# Patient Record
Sex: FEMALE | Race: WHITE | NOT HISPANIC OR LATINO | Employment: FULL TIME | URBAN - METROPOLITAN AREA
[De-identification: names, ages, dates, MRNs, and addresses within clinical notes are randomized per-mention and may not be internally consistent; named-entity substitution may affect disease eponyms.]

---

## 2019-02-04 ENCOUNTER — OFFICE VISIT (OUTPATIENT)
Dept: URGENT CARE | Facility: CLINIC | Age: 24
End: 2019-02-04
Payer: COMMERCIAL

## 2019-02-04 VITALS
HEIGHT: 69 IN | DIASTOLIC BLOOD PRESSURE: 89 MMHG | BODY MASS INDEX: 35.55 KG/M2 | TEMPERATURE: 98 F | SYSTOLIC BLOOD PRESSURE: 132 MMHG | RESPIRATION RATE: 18 BRPM | HEART RATE: 82 BPM | OXYGEN SATURATION: 96 % | WEIGHT: 240 LBS

## 2019-02-04 DIAGNOSIS — S61.213A LACERATION OF LEFT MIDDLE FINGER WITHOUT FOREIGN BODY WITHOUT DAMAGE TO NAIL, INITIAL ENCOUNTER: Primary | ICD-10-CM

## 2019-02-04 PROCEDURE — 12002 PR RESUP NPTERF WND BODY 2.6-7.5 CM: ICD-10-PCS | Mod: S$GLB,,, | Performed by: FAMILY MEDICINE

## 2019-02-04 PROCEDURE — 99203 OFFICE O/P NEW LOW 30 MIN: CPT | Mod: 25,S$GLB,, | Performed by: FAMILY MEDICINE

## 2019-02-04 PROCEDURE — 99203 PR OFFICE/OUTPT VISIT, NEW, LEVL III, 30-44 MIN: ICD-10-PCS | Mod: 25,S$GLB,, | Performed by: FAMILY MEDICINE

## 2019-02-04 PROCEDURE — 90471 TDAP VACCINE GREATER THAN OR EQUAL TO 7YO IM: ICD-10-PCS | Mod: S$GLB,,, | Performed by: FAMILY MEDICINE

## 2019-02-04 PROCEDURE — 12002 RPR S/N/AX/GEN/TRNK2.6-7.5CM: CPT | Mod: S$GLB,,, | Performed by: FAMILY MEDICINE

## 2019-02-04 PROCEDURE — 90471 IMMUNIZATION ADMIN: CPT | Mod: S$GLB,,, | Performed by: FAMILY MEDICINE

## 2019-02-04 PROCEDURE — 90715 TDAP VACCINE 7 YRS/> IM: CPT | Mod: S$GLB,,, | Performed by: FAMILY MEDICINE

## 2019-02-04 PROCEDURE — 90715 TDAP VACCINE GREATER THAN OR EQUAL TO 7YO IM: ICD-10-PCS | Mod: S$GLB,,, | Performed by: FAMILY MEDICINE

## 2019-02-04 RX ORDER — ESCITALOPRAM OXALATE 10 MG/1
10 TABLET ORAL DAILY
COMMUNITY
End: 2019-09-11

## 2019-02-04 RX ORDER — DEXTROAMPHETAMINE SACCHARATE, AMPHETAMINE ASPARTATE MONOHYDRATE, DEXTROAMPHETAMINE SULFATE AND AMPHETAMINE SULFATE 2.5; 2.5; 2.5; 2.5 MG/1; MG/1; MG/1; MG/1
20 CAPSULE, EXTENDED RELEASE ORAL EVERY MORNING
COMMUNITY
End: 2019-09-11

## 2019-02-06 ENCOUNTER — OFFICE VISIT (OUTPATIENT)
Dept: URGENT CARE | Facility: CLINIC | Age: 24
End: 2019-02-06
Payer: COMMERCIAL

## 2019-02-06 VITALS
DIASTOLIC BLOOD PRESSURE: 84 MMHG | WEIGHT: 240 LBS | SYSTOLIC BLOOD PRESSURE: 122 MMHG | HEART RATE: 90 BPM | TEMPERATURE: 98 F | OXYGEN SATURATION: 100 % | BODY MASS INDEX: 35.55 KG/M2 | RESPIRATION RATE: 16 BRPM | HEIGHT: 69 IN

## 2019-02-06 DIAGNOSIS — Z51.89 VISIT FOR WOUND CHECK: Primary | ICD-10-CM

## 2019-02-06 PROCEDURE — 99499 NO LOS: ICD-10-PCS | Mod: S$GLB,,, | Performed by: NURSE PRACTITIONER

## 2019-02-06 PROCEDURE — 99499 UNLISTED E&M SERVICE: CPT | Mod: S$GLB,,, | Performed by: NURSE PRACTITIONER

## 2019-02-06 NOTE — PROGRESS NOTES
"Subjective:       Patient ID: Micaela Arreguin is a 24 y.o. female.    Vitals:  height is 5' 9" (1.753 m) and weight is 108.9 kg (240 lb). Her oral temperature is 98.4 °F (36.9 °C). Her blood pressure is 122/84 and her pulse is 90. Her respiration is 16 and oxygen saturation is 100%.     Chief Complaint: Hand Pain (left hand)    Onset from Sunday, [place] urgent care [place] and her finger was glued.  She just wants to make sure it is normal for it to appear the way it does        Hand Pain    The incident occurred 3 to 5 days ago. The incident occurred at home. The injury mechanism was a direct blow. The pain is present in the right hand. The pain does not radiate. The pain is at a severity of 5/10. The pain is moderate. The pain has been fluctuating since the incident. The symptoms are aggravated by palpation.       Constitution: Negative for chills and fever.   HENT: Negative for facial swelling and sore throat.    Neck: Negative for painful lymph nodes.   Eyes: Negative for eye itching and eyelid swelling.   Respiratory: Negative for cough.    Musculoskeletal: Negative for joint pain and joint swelling.   Skin: Positive for rash and laceration. Negative for color change, pale, wound, abrasion, lesion, skin thickening/induration, puncture wound, erythema, bruising, abscess, avulsion and hives.   Allergic/Immunologic: Negative for environmental allergies, immunocompromised state and hives.   Hematologic/Lymphatic: Negative for swollen lymph nodes.       Objective:      Physical Exam   Musculoskeletal:        Hands:  Skin: No erythema.   Vitals reviewed.      Assessment:       1. Visit for wound check        Plan:         Visit for wound check    No results found for this or any previous visit.  Patient Instructions     Laceration, Chin, Skin Glue Repair  A laceration is a cut through the skin. If you have a chin laceration, skin glue may be used to repair the cut. Skin glue is usually used on cuts " that are shallow, have smooth edges, and are not infected. A lower layer of skin may be closed with sutures before skin glue is applied. Skin glue provides a water-resistant covering that allows for fast healing. No bandages are required and skin glue peels off on its own within 5 to 10 days.  Home care  Medicines:  Acetaminophen or ibuprofen may be taken for pain, unless another pain medicine was prescribed. If you have chronic liver or kidney disease, talk with your doctor before using these medicines. Also talk with your doctor if you have ever had a stomach ulcer or gastrointestinal bleeding.  General care  · Keep the wound clean and dry. You may shower or bathe as usual, but do not use soaps, lotions, or ointments on the wound area. Do not scrub the wound. After bathing, pat the wound dry with a soft towel.  · Avoid soaking the laceration in water for 7 to 10 days. Use a clean cloth to gently pat the area dry if it gets wet.  · Do not scratch, rub, or pick at the skin glue. Do not place tape directly over the skin glue.  · Do not apply liquids (such as peroxide), ointments, or creams to the wound while the skin glue is in place.  · If the adhesive does not peel off after 10 days, apply petroleum jelly or an antibiotic ointment to the area.  · Most chin wounds heal without problems. However, an infection sometimes occurs despite proper treatment. Therefore, watch for the signs of infection listed below.  · Certain types of skin glues cannot be used if you have an allergy to latex or formaldehyde. Tell your doctor right away about your allergies.  Follow-up care  Follow up with your healthcare provider, or as advised. The skin glue will fall off naturally in 5 to 10 days.  When to seek medical advice  Call your healthcare provider right away if any of these occur:  · Signs of infection:  ¨ Fever of 100.4°F (38ºC) or higher, or as directed by your health care provider  ¨ Increasing pain in the wound  ¨ Increasing  redness or swelling  ¨ Pus coming from the wound  · Wound bleeds more than a small amount or bleeding doesnt stop  · Wound edges come apart  Date Last Reviewed: 9/1/2016  © 3046-0247 [employer]. [address] [place] [address], [place] [address]. All rights reserved. This information is not intended as a substitute for professional medical care. Always follow your healthcare professional's instructions.

## 2019-02-06 NOTE — PATIENT INSTRUCTIONS
Laceration, Chin, Skin Glue Repair  A laceration is a cut through the skin. If you have a chin laceration, skin glue may be used to repair the cut. Skin glue is usually used on cuts that are shallow, have smooth edges, and are not infected. A lower layer of skin may be closed with sutures before skin glue is applied. Skin glue provides a water-resistant covering that allows for fast healing. No bandages are required and skin glue peels off on its own within 5 to 10 days.  Home care  Medicines:  Acetaminophen or ibuprofen may be taken for pain, unless another pain medicine was prescribed. If you have chronic liver or kidney disease, talk with your doctor before using these medicines. Also talk with your doctor if you have ever had a stomach ulcer or gastrointestinal bleeding.  General care  · Keep the wound clean and dry. You may shower or bathe as usual, but do not use soaps, lotions, or ointments on the wound area. Do not scrub the wound. After bathing, pat the wound dry with a soft towel.  · Avoid soaking the laceration in water for 7 to 10 days. Use a clean cloth to gently pat the area dry if it gets wet.  · Do not scratch, rub, or pick at the skin glue. Do not place tape directly over the skin glue.  · Do not apply liquids (such as peroxide), ointments, or creams to the wound while the skin glue is in place.  · If the adhesive does not peel off after 10 days, apply petroleum jelly or an antibiotic ointment to the area.  · Most chin wounds heal without problems. However, an infection sometimes occurs despite proper treatment. Therefore, watch for the signs of infection listed below.  · Certain types of skin glues cannot be used if you have an allergy to latex or formaldehyde. Tell your doctor right away about your allergies.  Follow-up care  Follow up with your healthcare provider, or as advised. The skin glue will fall off naturally in 5 to 10 days.  When to seek medical advice  Call your healthcare provider  right away if any of these occur:  · Signs of infection:  ¨ Fever of 100.4°F (38ºC) or higher, or as directed by your health care provider  ¨ Increasing pain in the wound  ¨ Increasing redness or swelling  ¨ Pus coming from the wound  · Wound bleeds more than a small amount or bleeding doesnt stop  · Wound edges come apart  Date Last Reviewed: 9/1/2016  © 7222-0559 [employer]. [address] [place] [address], [place] [address]. All rights reserved. This information is not intended as a substitute for professional medical care. Always follow your healthcare professional's instructions.

## 2019-07-01 ENCOUNTER — TELEPHONE (OUTPATIENT)
Dept: OBSTETRICS AND GYNECOLOGY | Facility: CLINIC | Age: 24
End: 2019-07-01

## 2019-07-01 ENCOUNTER — PATIENT MESSAGE (OUTPATIENT)
Dept: OBSTETRICS AND GYNECOLOGY | Facility: CLINIC | Age: 24
End: 2019-07-01

## 2019-09-11 ENCOUNTER — OFFICE VISIT (OUTPATIENT)
Dept: OBSTETRICS AND GYNECOLOGY | Facility: CLINIC | Age: 24
End: 2019-09-11
Attending: OBSTETRICS & GYNECOLOGY
Payer: COMMERCIAL

## 2019-09-11 VITALS
SYSTOLIC BLOOD PRESSURE: 130 MMHG | BODY MASS INDEX: 36.73 KG/M2 | HEIGHT: 69 IN | WEIGHT: 248 LBS | DIASTOLIC BLOOD PRESSURE: 80 MMHG

## 2019-09-11 DIAGNOSIS — Z01.419 ENCOUNTER FOR GYNECOLOGICAL EXAMINATION WITHOUT ABNORMAL FINDING: ICD-10-CM

## 2019-09-11 DIAGNOSIS — Z12.4 SCREENING FOR MALIGNANT NEOPLASM OF CERVIX: Primary | ICD-10-CM

## 2019-09-11 PROCEDURE — 99385 PREV VISIT NEW AGE 18-39: CPT | Mod: S$GLB,,, | Performed by: OBSTETRICS & GYNECOLOGY

## 2019-09-11 PROCEDURE — 88175 CYTOPATH C/V AUTO FLUID REDO: CPT

## 2019-09-11 PROCEDURE — 99385 PR PREVENTIVE VISIT,NEW,18-39: ICD-10-PCS | Mod: S$GLB,,, | Performed by: OBSTETRICS & GYNECOLOGY

## 2019-09-11 RX ORDER — DEXTROAMPHETAMINE SACCHARATE, AMPHETAMINE ASPARTATE MONOHYDRATE, DEXTROAMPHETAMINE SULFATE AND AMPHETAMINE SULFATE 5; 5; 5; 5 MG/1; MG/1; MG/1; MG/1
CAPSULE, EXTENDED RELEASE ORAL
COMMUNITY
Start: 2019-08-21 | End: 2020-12-03 | Stop reason: SDUPTHER

## 2019-09-11 RX ORDER — ESCITALOPRAM OXALATE 20 MG/1
TABLET ORAL
COMMUNITY
Start: 2019-08-07 | End: 2020-09-28 | Stop reason: ALTCHOICE

## 2019-09-11 NOTE — PROGRESS NOTES
SUBJECTIVE:   24 y.o. female   for annual routine Pap and checkup. Patient's last menstrual period was 2019..  She has no unusual complaints.    She declines STD testing  Past Medical History:   Diagnosis Date    ADHD (attention deficit hyperactivity disorder)     Depression      Past Surgical History:   Procedure Laterality Date    WISDOM TOOTH EXTRACTION       Social History     Socioeconomic History    Marital status: Single     Spouse name: Not on file    Number of children: Not on file    Years of education: Not on file    Highest education level: Not on file   Occupational History    Not on file   Social Needs    Financial resource strain: Not on file    Food insecurity:     Worry: Not on file     Inability: Not on file    Transportation needs:     Medical: Not on file     Non-medical: Not on file   Tobacco Use    Smoking status: Never Smoker    Smokeless tobacco: Never Used   Substance and Sexual Activity    Alcohol use: Yes     Frequency: Never     Comment: socially    Drug use: Never    Sexual activity: Not Currently     Birth control/protection: None   Lifestyle    Physical activity:     Days per week: Not on file     Minutes per session: Not on file    Stress: Not on file   Relationships    Social connections:     Talks on phone: Not on file     Gets together: Not on file     Attends Anabaptist service: Not on file     Active member of club or organization: Not on file     Attends meetings of clubs or organizations: Not on file     Relationship status: Not on file   Other Topics Concern    Not on file   Social History Narrative    Not on file     Family History   Problem Relation Age of Onset    No Known Problems Mother     ALS Father     Breast cancer Neg Hx     Cancer Neg Hx     Colon cancer Neg Hx     Ovarian cancer Neg Hx      OB History    Para Term  AB Living   0 0 0 0 0 0   SAB TAB Ectopic Multiple Live Births   0 0 0 0 0           Current  Outpatient Medications   Medication Sig Dispense Refill    dextroamphetamine-amphetamine (ADDERALL XR) 20 MG 24 hr capsule       escitalopram oxalate (LEXAPRO) 20 MG tablet       norethindrone-e.estradiol-iron (LO LOESTRIN FE) 1 mg-10 mcg (24)/10 mcg (2) Tab Take 1 tablet by mouth once daily. 24 tablet 11     No current facility-administered medications for this visit.      Allergies: Patient has no known allergies.     The ASCVD Risk score (Van CHINEDU Jr., et al., 2013) failed to calculate for the following reasons:    The 2013 ASCVD risk score is only valid for ages 40 to 79      ROS:  Constitutional: no weight loss, weight gain, fever, fatigue  Eyes:  No vision changes, glasses/contacts  ENT/Mouth: No ulcers, sinus problems, ears ringing, headache  Cardiovascular: No inability to lie flat, chest pain, exercise intolerance, swelling, heart palpitations  Respiratory: No wheezing, coughing blood, shortness of breath, or cough  Gastrointestinal: No diarrhea, bloody stool, nausea/vomiting, constipation, gas, hemorrhoids  Genitourinary: No blood in urine, painful urination, urgency of urination, frequency of urination, incomplete emptying, incontinence, abnormal bleeding, painful periods, heavy periods, vaginal discharge, vaginal odor, painful intercourse, sexual problems, bleeding after intercourse.  Musculoskeletal: No muscle weakness  Skin/Breast: No painful breasts, nipple discharge, masses, rash, ulcers  Neurological: No passing out, seizures, numbness, headache  Endocrine: No diabetes, hypothyroid, hyperthyroid, hot flashes, hair loss, abnormal hair growth, acne  Psychiatric: No depression, crying  Hematologic: No bruises, bleeding, swollen lymph nodes, anemia.      Physical Exam:   Constitutional: She is oriented to person, place, and time. She appears well-developed and well-nourished.      Neck: Normal range of motion. No tracheal deviation present. No thyromegaly present.    Cardiovascular: Exam reveals no  edema.     Pulmonary/Chest: Effort normal. She exhibits no mass, no tenderness, no deformity and no retraction. Right breast exhibits no inverted nipple, no mass, no nipple discharge, no skin change, no tenderness, presence, no bleeding and no swelling. Left breast exhibits no inverted nipple, no mass, no nipple discharge, no skin change, no tenderness, presence, no bleeding and no swelling. Breasts are symmetrical.        Abdominal: Soft. She exhibits no distension and no mass. There is no tenderness. There is no rebound and no guarding. No hernia. Hernia confirmed negative in the left inguinal area.     Genitourinary: Vagina normal and uterus normal. Rectal exam shows no external hemorrhoid. There is no rash, tenderness or lesion on the right labia. There is no rash, tenderness or lesion on the left labia. Uterus is not deviated. Cervix is normal. No no adexnal prolapse. Right adnexum displays no mass, no tenderness and no fullness. Left adnexum displays no mass, no tenderness and no fullness. No tenderness, bleeding, rectocele, cystocele or unspecified prolapse of vaginal walls in the vagina. No vaginal discharge found. Cervix exhibits no motion tenderness, no discharge and no friability.           Musculoskeletal: Normal range of motion and moves all extremeties. She exhibits no edema.      Lymphadenopathy:        Right: No inguinal adenopathy present.        Left: No inguinal adenopathy present.    Neurological: She is alert and oriented to person, place, and time.    Skin: No rash noted. No erythema. No pallor.    Psychiatric: She has a normal mood and affect. Her behavior is normal. Judgment and thought content normal.         ASSESSMENT:   well woman  no contraindication to continue use of oral contraceptives    PLAN:   return annually or prn  Pap smear

## 2019-10-14 ENCOUNTER — TELEPHONE (OUTPATIENT)
Dept: OBSTETRICS AND GYNECOLOGY | Facility: CLINIC | Age: 24
End: 2019-10-14

## 2019-10-14 RX ORDER — NORETHINDRONE ACETATE AND ETHINYL ESTRADIOL 1MG-20(21)
1 KIT ORAL DAILY
Qty: 30 TABLET | Refills: 11 | Status: SHIPPED | OUTPATIENT
Start: 2019-10-14 | End: 2020-10-27 | Stop reason: SDUPTHER

## 2019-10-14 NOTE — TELEPHONE ENCOUNTER
Left message for pt faxed received from [employer] that the Loloestrin is not covered by insurance and pt should call back to let me know if she wants to change or not

## 2019-10-14 NOTE — TELEPHONE ENCOUNTER
Pt called back and states she would like a pill that is covered by her insurance. Advised pt will send one in

## 2019-11-06 ENCOUNTER — OFFICE VISIT (OUTPATIENT)
Dept: OPTOMETRY | Facility: CLINIC | Age: 24
End: 2019-11-06
Payer: COMMERCIAL

## 2019-11-06 DIAGNOSIS — H04.123 DRY EYES, BILATERAL: Primary | ICD-10-CM

## 2019-11-06 PROCEDURE — 99999 PR PBB SHADOW E&M-EST. PATIENT-LVL II: ICD-10-PCS | Mod: PBBFAC,,, | Performed by: OPTOMETRIST

## 2019-11-06 PROCEDURE — 92004 COMPRE OPH EXAM NEW PT 1/>: CPT | Mod: S$GLB,,, | Performed by: OPTOMETRIST

## 2019-11-06 PROCEDURE — 92015 PR REFRACTION: ICD-10-PCS | Mod: S$GLB,,, | Performed by: OPTOMETRIST

## 2019-11-06 PROCEDURE — 92015 DETERMINE REFRACTIVE STATE: CPT | Mod: S$GLB,,, | Performed by: OPTOMETRIST

## 2019-11-06 PROCEDURE — 99999 PR PBB SHADOW E&M-EST. PATIENT-LVL II: CPT | Mod: PBBFAC,,, | Performed by: OPTOMETRIST

## 2019-11-06 PROCEDURE — 92004 PR EYE EXAM, NEW PATIENT,COMPREHESV: ICD-10-PCS | Mod: S$GLB,,, | Performed by: OPTOMETRIST

## 2019-11-06 NOTE — PROGRESS NOTES
HPI     WENDY: 2016  Pt states she is having blurry VA in just her left eye with reading and   dist, even in the morning when she wakes up. Pt states that he blurry VA   comes and goes. Never worn spex  No f/f  No gtts  No sx     Last edited by eZ Chin, OD on 11/6/2019  4:12 PM. (History)        ROS     Negative for: Constitutional, Gastrointestinal, Neurological, Skin,   Genitourinary, Musculoskeletal, HENT, Endocrine, Cardiovascular, Eyes,   Respiratory, Psychiatric, Allergic/Imm, Heme/Lymph    Last edited by Ze Chin, OD on 11/6/2019  4:12 PM. (History)        Assessment /Plan     For exam results, see Encounter Report.    Dry eyes, bilateral      Dry eye sx w inc computer use.  Advised SOOTHE XP Ats TID+.  Also discussed otc readers +1.00 to help eyestrain    PLAN:    Pt will call if sx persist w tx--otherwise rtc 1 yr

## 2020-09-14 ENCOUNTER — PATIENT OUTREACH (OUTPATIENT)
Dept: ADMINISTRATIVE | Facility: HOSPITAL | Age: 25
End: 2020-09-14

## 2020-09-27 NOTE — PROGRESS NOTES
Subjective:       Patient ID: Micaela Arreguin is a 25 y.o. female.    Chief Complaint: Establish Care    HPI    The patient location is: [place]  The chief complaint leading to consultation is: establish care and medical concerns  Visit type: Virtual visit with synchronous audio and video  Total time spent with patient: 18 minutes  Each patient to whom he or she provides medical services by telemedicine is:  (1) informed of the relationship between the physician and patient and the respective role of any other health care provider with respect to management of the patient; and (2) notified that he or she may decline to receive medical services by telemedicine and may withdraw from such care at any time.    Notes:     24 yo here to establish care and discuss medical concerns.     She reports that she develops a sensation of scratchy throat after eating walnuts. Mild tongue swelling- felt heavy. She denies throat swelling. No dyspnea. No dysphagia or odynophagia. She has taken benadryl as needed with improvement.     She also reports sporadically seeing bright red blood on toilet paper after a BM. This started over a year ago. Recently has noticed every few days. No bleeding without a BM. Rarely in toilet bowl/stool. She denies straining. She denies rectal irritation. She reports a family history of IBS and reports that she does experience abdominal pain with sudden urge to defecate. No known family history of IBD or colon cancer.       Review of Systems   Constitutional: Negative for activity change and unexpected weight change.   HENT: Positive for rhinorrhea. Negative for hearing loss and trouble swallowing.    Eyes: Negative for discharge and visual disturbance.   Respiratory: Negative for chest tightness, shortness of breath, wheezing and stridor.    Cardiovascular: Negative for chest pain and palpitations.   Gastrointestinal: Positive for anal bleeding. Negative for blood in stool, constipation, diarrhea, rectal  pain and vomiting.   Endocrine: Negative for polydipsia and polyuria.   Genitourinary: Negative for difficulty urinating, dysuria, hematuria and menstrual problem.   Musculoskeletal: Negative for arthralgias, joint swelling and neck pain.   Neurological: Negative for weakness and headaches.   Psychiatric/Behavioral: Negative for confusion and dysphoric mood.       Objective:        There were no vitals filed for this visit.    Physical Exam  Constitutional:       General: She is not in acute distress.     Appearance: She is well-developed. She is not diaphoretic.   HENT:      Head: Normocephalic and atraumatic.      Right Ear: External ear normal.      Left Ear: External ear normal.   Eyes:      Conjunctiva/sclera: Conjunctivae normal.   Neck:      Musculoskeletal: Normal range of motion.   Pulmonary:      Effort: Pulmonary effort is normal. No respiratory distress.   Neurological:      Mental Status: She is alert.   Psychiatric:         Behavior: Behavior normal.         Assessment:     1. Encounter to establish care    2. [place] allergy    3. Rectal bleeding    4. Irritable bowel syndrome, unspecified type           Plan:         1. Encounter to establish care  - pap utd  - immunizations utd  - CBC auto differential; Future  - Comprehensive metabolic panel; Future  - Hemoglobin A1C; Future  - Lipid Panel; Future  - TSH; Future    2. [place] allergy  - avoid trigger  - she will consider allergy consult  - discussed epi-pen and ER precautions  - EPINEPHrine (EPIPEN) 0.3 mg/0.3 mL AtIn; Inject 0.3 mLs (0.3 mg total) into the muscle once. for 1 dose  Dispense: 0.6 mL; Refill: 1    3. Rectal bleeding  - suspect hemorrhoid, but occ abd pain and sudden urge to defecate need evaluation. D/w her the limited nature of virtual visit; will refer to GI.   - Ambulatory referral/consult to Gastroenterology; Future  - hydrocortisone (ANUSOL-HC) 2.5 % rectal cream; Apply rectally 2 times daily  Dispense: 28 g; Refill: 1    4.  Irritable bowel syndrome, unspecified type  - Ambulatory referral/consult to Gastroenterology; Future           Patient note was created using MModal Dictation.  Any errors in syntax or even information may not have been identified and edited on initial review prior to signing this note.

## 2020-09-28 ENCOUNTER — OFFICE VISIT (OUTPATIENT)
Dept: INTERNAL MEDICINE | Facility: CLINIC | Age: 25
End: 2020-09-28
Payer: COMMERCIAL

## 2020-09-28 DIAGNOSIS — Z91.018 WALNUT ALLERGY: ICD-10-CM

## 2020-09-28 DIAGNOSIS — K58.9 IRRITABLE BOWEL SYNDROME, UNSPECIFIED TYPE: ICD-10-CM

## 2020-09-28 DIAGNOSIS — Z76.89 ENCOUNTER TO ESTABLISH CARE: Primary | ICD-10-CM

## 2020-09-28 DIAGNOSIS — K62.5 RECTAL BLEEDING: ICD-10-CM

## 2020-09-28 PROCEDURE — 99204 OFFICE O/P NEW MOD 45 MIN: CPT | Mod: 95,,, | Performed by: INTERNAL MEDICINE

## 2020-09-28 PROCEDURE — 99204 PR OFFICE/OUTPT VISIT, NEW, LEVL IV, 45-59 MIN: ICD-10-PCS | Mod: 95,,, | Performed by: INTERNAL MEDICINE

## 2020-09-28 RX ORDER — EPINEPHRINE 0.3 MG/.3ML
1 INJECTION SUBCUTANEOUS ONCE
Qty: 0.6 ML | Refills: 1 | Status: SHIPPED | OUTPATIENT
Start: 2020-09-28 | End: 2021-09-22

## 2020-09-28 RX ORDER — HYDROCORTISONE 25 MG/G
CREAM TOPICAL
Qty: 28 G | Refills: 1 | Status: SHIPPED | OUTPATIENT
Start: 2020-09-28 | End: 2021-06-20

## 2020-09-28 RX ORDER — ZOLPIDEM TARTRATE 10 MG/1
TABLET ORAL
COMMUNITY
Start: 2020-08-15 | End: 2021-06-20

## 2020-09-28 RX ORDER — DESVENLAFAXINE SUCCINATE 50 MG/1
TABLET, EXTENDED RELEASE ORAL
COMMUNITY
Start: 2020-09-15 | End: 2022-03-04

## 2020-09-28 RX ORDER — DEXTROAMPHETAMINE SACCHARATE, AMPHETAMINE ASPARTATE MONOHYDRATE, DEXTROAMPHETAMINE SULFATE AND AMPHETAMINE SULFATE 7.5; 7.5; 7.5; 7.5 MG/1; MG/1; MG/1; MG/1
CAPSULE, EXTENDED RELEASE ORAL
COMMUNITY
Start: 2020-02-28 | End: 2023-10-09

## 2020-09-28 NOTE — Clinical Note
Please assist pt in scheduling fasting labs.   Please have referral coordinator schedule GI consult.

## 2020-10-02 ENCOUNTER — PATIENT MESSAGE (OUTPATIENT)
Dept: INTERNAL MEDICINE | Facility: CLINIC | Age: 25
End: 2020-10-02

## 2020-10-23 ENCOUNTER — PATIENT MESSAGE (OUTPATIENT)
Dept: INTERNAL MEDICINE | Facility: CLINIC | Age: 25
End: 2020-10-23

## 2020-10-27 RX ORDER — NORETHINDRONE ACETATE AND ETHINYL ESTRADIOL 1MG-20(21)
1 KIT ORAL DAILY
Qty: 30 TABLET | Refills: 2 | Status: SHIPPED | OUTPATIENT
Start: 2020-10-27 | End: 2021-06-20

## 2020-10-27 NOTE — TELEPHONE ENCOUNTER
Faxed received from [employer] for refill on OCPs. Pt last seen 9/2019. Pt is due for a appointment

## 2020-10-30 ENCOUNTER — PATIENT MESSAGE (OUTPATIENT)
Dept: INTERNAL MEDICINE | Facility: CLINIC | Age: 25
End: 2020-10-30

## 2020-11-12 ENCOUNTER — LAB VISIT (OUTPATIENT)
Dept: LAB | Facility: HOSPITAL | Age: 25
End: 2020-11-12
Attending: INTERNAL MEDICINE
Payer: COMMERCIAL

## 2020-11-12 DIAGNOSIS — Z76.89 ENCOUNTER TO ESTABLISH CARE: ICD-10-CM

## 2020-11-12 LAB
ALBUMIN SERPL BCP-MCNC: 3.8 G/DL (ref 3.5–5.2)
ALP SERPL-CCNC: 82 U/L (ref 55–135)
ALT SERPL W/O P-5'-P-CCNC: 18 U/L (ref 10–44)
ANION GAP SERPL CALC-SCNC: 9 MMOL/L (ref 8–16)
AST SERPL-CCNC: 21 U/L (ref 10–40)
BASOPHILS # BLD AUTO: 0.05 K/UL (ref 0–0.2)
BASOPHILS NFR BLD: 0.6 % (ref 0–1.9)
BILIRUB SERPL-MCNC: 0.6 MG/DL (ref 0.1–1)
BUN SERPL-MCNC: 9 MG/DL (ref 6–20)
CALCIUM SERPL-MCNC: 9.3 MG/DL (ref 8.7–10.5)
CHLORIDE SERPL-SCNC: 104 MMOL/L (ref 95–110)
CHOLEST SERPL-MCNC: 176 MG/DL (ref 120–199)
CHOLEST/HDLC SERPL: 3.6 {RATIO} (ref 2–5)
CO2 SERPL-SCNC: 25 MMOL/L (ref 23–29)
CREAT SERPL-MCNC: 0.8 MG/DL (ref 0.5–1.4)
DIFFERENTIAL METHOD: ABNORMAL
EOSINOPHIL # BLD AUTO: 0.1 K/UL (ref 0–0.5)
EOSINOPHIL NFR BLD: 0.6 % (ref 0–8)
ERYTHROCYTE [DISTWIDTH] IN BLOOD BY AUTOMATED COUNT: 12.1 % (ref 11.5–14.5)
EST. GFR  (AFRICAN AMERICAN): >60 ML/MIN/1.73 M^2
EST. GFR  (NON AFRICAN AMERICAN): >60 ML/MIN/1.73 M^2
ESTIMATED AVG GLUCOSE: 100 MG/DL (ref 68–131)
GLUCOSE SERPL-MCNC: 68 MG/DL (ref 70–110)
HBA1C MFR BLD HPLC: 5.1 % (ref 4–5.6)
HCT VFR BLD AUTO: 41 % (ref 37–48.5)
HDLC SERPL-MCNC: 49 MG/DL (ref 40–75)
HDLC SERPL: 27.8 % (ref 20–50)
HGB BLD-MCNC: 13.1 G/DL (ref 12–16)
IMM GRANULOCYTES # BLD AUTO: 0.03 K/UL (ref 0–0.04)
IMM GRANULOCYTES NFR BLD AUTO: 0.4 % (ref 0–0.5)
LDLC SERPL CALC-MCNC: 111.8 MG/DL (ref 63–159)
LYMPHOCYTES # BLD AUTO: 1.8 K/UL (ref 1–4.8)
LYMPHOCYTES NFR BLD: 21 % (ref 18–48)
MCH RBC QN AUTO: 29.6 PG (ref 27–31)
MCHC RBC AUTO-ENTMCNC: 32 G/DL (ref 32–36)
MCV RBC AUTO: 93 FL (ref 82–98)
MONOCYTES # BLD AUTO: 0.7 K/UL (ref 0.3–1)
MONOCYTES NFR BLD: 8.2 % (ref 4–15)
NEUTROPHILS # BLD AUTO: 5.9 K/UL (ref 1.8–7.7)
NEUTROPHILS NFR BLD: 69.2 % (ref 38–73)
NONHDLC SERPL-MCNC: 127 MG/DL
NRBC BLD-RTO: 0 /100 WBC
PLATELET # BLD AUTO: 361 K/UL (ref 150–350)
PMV BLD AUTO: 10.7 FL (ref 9.2–12.9)
POTASSIUM SERPL-SCNC: 4.1 MMOL/L (ref 3.5–5.1)
PROT SERPL-MCNC: 7.1 G/DL (ref 6–8.4)
RBC # BLD AUTO: 4.43 M/UL (ref 4–5.4)
SODIUM SERPL-SCNC: 138 MMOL/L (ref 136–145)
TRIGL SERPL-MCNC: 76 MG/DL (ref 30–150)
TSH SERPL DL<=0.005 MIU/L-ACNC: 0.86 UIU/ML (ref 0.4–4)
WBC # BLD AUTO: 8.53 K/UL (ref 3.9–12.7)

## 2020-11-12 PROCEDURE — 85025 COMPLETE CBC W/AUTO DIFF WBC: CPT

## 2020-11-12 PROCEDURE — 84443 ASSAY THYROID STIM HORMONE: CPT

## 2020-11-12 PROCEDURE — 83036 HEMOGLOBIN GLYCOSYLATED A1C: CPT

## 2020-11-12 PROCEDURE — 36415 COLL VENOUS BLD VENIPUNCTURE: CPT | Mod: PO

## 2020-11-12 PROCEDURE — 80061 LIPID PANEL: CPT

## 2020-11-12 PROCEDURE — 80053 COMPREHEN METABOLIC PANEL: CPT

## 2020-11-13 ENCOUNTER — TELEPHONE (OUTPATIENT)
Dept: INTERNAL MEDICINE | Facility: CLINIC | Age: 25
End: 2020-11-13

## 2020-11-13 NOTE — TELEPHONE ENCOUNTER
----- Message from Joselyn Loera MD sent at 11/12/2020  7:48 PM CST -----  Results released to patient portal.

## 2020-12-03 ENCOUNTER — LAB VISIT (OUTPATIENT)
Dept: LAB | Facility: HOSPITAL | Age: 25
End: 2020-12-03
Payer: COMMERCIAL

## 2020-12-03 ENCOUNTER — OFFICE VISIT (OUTPATIENT)
Dept: SURGERY | Facility: CLINIC | Age: 25
End: 2020-12-03
Payer: COMMERCIAL

## 2020-12-03 ENCOUNTER — OFFICE VISIT (OUTPATIENT)
Dept: GASTROENTEROLOGY | Facility: CLINIC | Age: 25
End: 2020-12-03
Payer: COMMERCIAL

## 2020-12-03 VITALS
HEIGHT: 69 IN | BODY MASS INDEX: 35.62 KG/M2 | WEIGHT: 240.5 LBS | HEART RATE: 96 BPM | SYSTOLIC BLOOD PRESSURE: 115 MMHG | DIASTOLIC BLOOD PRESSURE: 81 MMHG

## 2020-12-03 VITALS
WEIGHT: 240.5 LBS | BODY MASS INDEX: 35.62 KG/M2 | HEIGHT: 69 IN | SYSTOLIC BLOOD PRESSURE: 115 MMHG | HEART RATE: 96 BPM | DIASTOLIC BLOOD PRESSURE: 81 MMHG

## 2020-12-03 DIAGNOSIS — K62.5 RECTAL BLEEDING: ICD-10-CM

## 2020-12-03 DIAGNOSIS — R10.30 LOWER ABDOMINAL PAIN: ICD-10-CM

## 2020-12-03 DIAGNOSIS — R19.7 DIARRHEA, UNSPECIFIED TYPE: ICD-10-CM

## 2020-12-03 DIAGNOSIS — K60.2 ANAL FISSURE: Primary | ICD-10-CM

## 2020-12-03 DIAGNOSIS — K59.00 CONSTIPATION, UNSPECIFIED CONSTIPATION TYPE: Primary | ICD-10-CM

## 2020-12-03 DIAGNOSIS — K59.00 CONSTIPATION, UNSPECIFIED CONSTIPATION TYPE: ICD-10-CM

## 2020-12-03 LAB
CRP SERPL-MCNC: 3.1 MG/L (ref 0–8.2)
IGA SERPL-MCNC: 99 MG/DL (ref 40–350)

## 2020-12-03 PROCEDURE — 1126F PR PAIN SEVERITY QUANTIFIED, NO PAIN PRESENT: ICD-10-PCS | Mod: S$GLB,,, | Performed by: NURSE PRACTITIONER

## 2020-12-03 PROCEDURE — 36415 COLL VENOUS BLD VENIPUNCTURE: CPT

## 2020-12-03 PROCEDURE — 1126F AMNT PAIN NOTED NONE PRSNT: CPT | Mod: S$GLB,,, | Performed by: NURSE PRACTITIONER

## 2020-12-03 PROCEDURE — 99999 PR PBB SHADOW E&M-EST. PATIENT-LVL III: ICD-10-PCS | Mod: PBBFAC,,, | Performed by: NURSE PRACTITIONER

## 2020-12-03 PROCEDURE — 99203 OFFICE O/P NEW LOW 30 MIN: CPT | Mod: 25,S$GLB,, | Performed by: NURSE PRACTITIONER

## 2020-12-03 PROCEDURE — 3008F PR BODY MASS INDEX (BMI) DOCUMENTED: ICD-10-PCS | Mod: CPTII,S$GLB,, | Performed by: NURSE PRACTITIONER

## 2020-12-03 PROCEDURE — 82784 ASSAY IGA/IGD/IGG/IGM EACH: CPT

## 2020-12-03 PROCEDURE — 99214 OFFICE O/P EST MOD 30 MIN: CPT | Mod: 24,S$GLB,, | Performed by: NURSE PRACTITIONER

## 2020-12-03 PROCEDURE — 86140 C-REACTIVE PROTEIN: CPT

## 2020-12-03 PROCEDURE — 83516 IMMUNOASSAY NONANTIBODY: CPT

## 2020-12-03 PROCEDURE — 3008F BODY MASS INDEX DOCD: CPT | Mod: CPTII,S$GLB,, | Performed by: NURSE PRACTITIONER

## 2020-12-03 PROCEDURE — 99999 PR PBB SHADOW E&M-EST. PATIENT-LVL III: CPT | Mod: PBBFAC,,, | Performed by: NURSE PRACTITIONER

## 2020-12-03 PROCEDURE — 99203 PR OFFICE/OUTPT VISIT, NEW, LEVL III, 30-44 MIN: ICD-10-PCS | Mod: 25,S$GLB,, | Performed by: NURSE PRACTITIONER

## 2020-12-03 PROCEDURE — 46600 PR DIAG2STIC A2SCOPY: ICD-10-PCS | Mod: S$GLB,,, | Performed by: NURSE PRACTITIONER

## 2020-12-03 PROCEDURE — 99214 PR OFFICE/OUTPT VISIT, EST, LEVL IV, 30-39 MIN: ICD-10-PCS | Mod: 24,S$GLB,, | Performed by: NURSE PRACTITIONER

## 2020-12-03 PROCEDURE — 46600 DIAGNOSTIC ANOSCOPY SPX: CPT | Mod: S$GLB,,, | Performed by: NURSE PRACTITIONER

## 2020-12-03 RX ORDER — DICYCLOMINE HYDROCHLORIDE 10 MG/1
10 CAPSULE ORAL
Qty: 90 CAPSULE | Refills: 1 | Status: SHIPPED | OUTPATIENT
Start: 2020-12-03 | End: 2021-02-01

## 2020-12-03 NOTE — LETTER
December 3, 2020      Fanny Iraheta NP  [address]  [facility] [address]           [facility] [address]  [address]  [facility] [address]  Phone: [phone]          Patient: Micaela Arreguin   MR Number: [id number]   YOB: 1995   Date of Visit: 12/3/2020       Dear Fanny Iraheta:    Thank you for referring Micaela Arreguin to me for evaluation. Attached you will find relevant portions of my assessment and plan of care.    If you have questions, please do not hesitate to call me. I look forward to following Micaela Arreguin along with you.    Sincerely,    Ivelisse Grimm NP    Enclosure  CC:  No Recipients    If you would like to receive this communication electronically, please contact [email or web address] or [phone] to request more information on [employer] Link access.    For providers and/or their staff who would like to refer a patient to [facility], please contact us through our one-stop-shop provider referral line, [facility], at [phone].    If you feel you have received this communication in error or would no longer like to receive these types of communications, please e-mail [email or web address]

## 2020-12-03 NOTE — PATIENT INSTRUCTIONS
Women need 25 grams of fiber per day, and men need 38 grams per day, according to the [place] [employer].    If you're not meeting your recommended daily dose of fiber via food, fiber supplementation is beneficial in helping meet those daily recommendations. Be sure to drink plenty of water while taking fiber supplementation. Make sure to read fiber supplementation drug label directions regarding when and how to take the supplementation.    Dietary fiber content of frequently consumed foods  Food Fiber, g/serving   Fruits   Apple (with skin) 3.5/1 medium-sized apple   Apricot (fresh) 1.8/3 apricots   Banana 2.5/1 banana   Cantaloupe 2.7/half edible portion   Dates 13.5/1 cup (chopped)   Grapefruit 1.6/half edible portion   Grapes 2.6/10 grapes   Oranges 2.6/1 orange   Peach (with skin) 2.1/1 peach   Pear (with skin) 4.6/1 pear   Pineapple 2.2/1 cup (diced)   Prunes 11.9/11 dried prunes   Raisins 2.2/packet   Strawberries 3.0/1 cup   Juices   Apple 0.74/1 cup   Grapefruit 1.0/1 cup   Grape 1.3/1 cup   Orange 1.0/1 cup   Vegetables   Cooked   Asparagus 1.5/7 irby   Beans, string, green 3.4/1 cup   Broccoli 5.0/1 stalk   [place] sprouts 4.6/7-8 sprouts   Cabbage 2.9/1 cup (cooked)   Carrots 4.6/1 cup   Cauliflower 2.1/1 cup   Peas 7.2/1 cup (cooked)   Potato (with skin) 2.3/1 boiled   Spinach 4.1/1 cup (raw)   Squash, summer 3.4/1 cup (cooked, diced)   Sweet potatoes 2.7/1 baked   Zucchini 4.2/1 cup (cooked, diced)   Raw   Cucumber 0.2/6-8 slices with skin   Lettuce 2.0/1 wedge iceberg   Mushrooms 0.8/half cup (sliced)   Onions 1.3/1 cup   Peppers, green 1.0/1 pod   Tomato 1.8/1 tomato   Spinach 8.0/1 cup (chopped)   Legumes   Baked beans 18.6/1 cup   Dried peas 4.7/half cup (cooked)   Kidney beans 7.4/half cup (cooked)   Lima beans 2.6/half cup (cooked)   Lentils 1.9/half cup (cooked)   Breads, pastas, and flours   Bagels 1.1/half bagel   Bran muffins 6.3/muffin   Cracked wheat 4.1/slice   Oatmeal 5.3/1 cup    Pumpernickel bread 1.0/slice   White bread 0.55/slice   Whole-wheat bread 1.66/slice   Pasta and rice cooked   Macaroni 1.0/1 cup (cooked)   Rice, brown 2.4/1 cup (cooked)   Rice, polished 0.6/1 cup (cooked)   Spaghetti (regular) 1.0/1 cup (cooked)   Flours and grains   Bran, oat 8.3/oz   Bran, wheat 12.4/oz   Rolled oats 13.7/1 cup (cooked)   Nuts   Almonds 3.6/half cup (slivered)   Peanuts 11.7/1 cup

## 2020-12-03 NOTE — LETTER
December 3, 2020      Joselyn Loera MD  [address]  [place] [address]           [facility]  [address]  [place] [address]  Phone: [phone]  Fax: [phone]          Patient: Micaela Arreguin   MR Number: [id number]   YOB: 1995   Date of Visit: 12/3/2020       Dear Dr. Joselyn Loera:    Thank you for referring Micaela Arreguin to me for evaluation. Attached you will find relevant portions of my assessment and plan of care.    If you have questions, please do not hesitate to call me. I look forward to following Micaela Arreguin along with you.    Sincerely,    Fanny Iraheta, SUKUMAR    Enclosure  CC:  No Recipients    If you would like to receive this communication electronically, please contact [email or web address] or [phone] to request more information on [employer] Link access.    For providers and/or their staff who would like to refer a patient to [facility], please contact us through our one-stop-shop provider referral line, [facility], at [phone].    If you feel you have received this communication in error or would no longer like to receive these types of communications, please e-mail [email or web address]

## 2020-12-03 NOTE — PROGRESS NOTES
[facility] Consultation Note    Reason for Consult:  The primary encounter diagnosis was Constipation, unspecified constipation type. Diagnoses of Lower abdominal pain, Diarrhea, unspecified type, and Rectal bleeding were also pertinent to this visit.    PCP:   Joselyn Loera   [address] [facility] [place] [address]    Referring MD:  Joselyn Loera Md  [address] [facility]  [place] [id number]    Initial History of Present Illness (HPI):  This is a 25 y.o. female here for evaluation of constipation, diarrhea, lower abdominal pain and rectal bleeding.  She is a new patient.  She reports this has been going on about 1.5 years. Not day to day.   The rectal bleeding happens couples time within the past couple of weeks.   Gets abd cramping and resolves with a BM.  Has diarrhea with the cramping. Can get 2-3 BMs in a day. No nocturnal BMs.   Constipation for a couple days, resolves on its own.  Reflux - no  Dysphagia - no   NSAID usage - very rarely  Takes imodium and pepto and gasx PRN    ROS:  Constitutional: No fevers, chills, No weight loss  ENT:  No sore throat, no PND  CV: No chest pain, no palpitation  Pulm: No cough, No shortness of breath, no wheezing  Ophtho: No vision changes  GI: see HPI  Derm: No rash, no itching  Heme: No easy bruising  MSK: No significant arthritis  : No dysuria, No hematuria  Neuro: No syncope, No seizure  Psych: No uncontrolled anxiety, No uncontrolled depression    Medical History:  has a past medical history of ADHD (attention deficit hyperactivity disorder) and Depression.    Surgical History:  has a past surgical history that includes Eros tooth extraction.    Family History: family history includes ALS in her father; No Known Problems in her mother..     Social History:  reports that she has never smoked. She has never used smokeless tobacco. She reports current alcohol use. She reports that she does not use drugs.    Review of patient's allergies indicates:  "  Allergen Reactions    Nut - unspecified Itching and Swelling       Medication List with Changes/Refills   New Medications    DICYCLOMINE (BENTYL) 10 MG CAPSULE    Take 1 capsule (10 mg total) by mouth before meals as needed.   Current Medications    DESVENLAFAXINE SUCCINATE (PRISTIQ) 50 MG TB24    TK 1 T PO QD    DEXTROAMPHETAMINE-AMPHETAMINE (ADDERALL XR) 30 MG 24 HR CAPSULE        EPINEPHRINE (EPIPEN) 0.3 MG/0.3 ML ATIN    Inject 0.3 mLs (0.3 mg total) into the muscle once. for 1 dose    HYDROCORTISONE (ANUSOL-HC) 2.5 % RECTAL CREAM    Apply rectally 2 times daily    NORETHINDRONE-ETHINYL ESTRADIOL (JUNEL FE 1/20) 1 MG-20 MCG (21)/75 MG (7) PER TABLET    Take 1 tablet by mouth once daily.    ZOLPIDEM (AMBIEN) 10 MG TAB    TK 1 T PO  QHS PRN SLEEP   Discontinued Medications    DEXTROAMPHETAMINE-AMPHETAMINE (ADDERALL XR) 20 MG 24 HR CAPSULE             Objective Findings:    Vital Signs:  /81 (BP Location: Left arm)   Pulse 96   Ht 5' 9" (1.753 m)   Wt 109.1 kg (240 lb 8.4 oz)   BMI 35.52 kg/m²   Body mass index is 35.52 kg/m².    Physical Exam:  General Appearance: Well appearing, NAD noted  Eyes:    No scleral icterus  ENT:  No lesions or masses   Lungs: BBS CTA ,  no wheezes  Heart:  HRRR, S1 & S2 normal, no murmurs heard  Abdomen:  Non distended, soft, no guarding, no rebound, no tenderness, no appreciated ascites  Musculoskeletal:  No major joint deformities  Skin: No petechiae or rash on exposed skin areas  Neurologic:  Alert and oriented x4  Psychiatric:  Normal speech mentation and affect    Labs reviewed:  Lab Results   Component Value Date    WBC 8.53 11/12/2020    HGB 13.1 11/12/2020    HCT 41.0 11/12/2020     (H) 11/12/2020    CHOL 176 11/12/2020    TRIG 76 11/12/2020    HDL 49 11/12/2020    ALT 18 11/12/2020    AST 21 11/12/2020     11/12/2020    K 4.1 11/12/2020     11/12/2020    CREATININE 0.8 11/12/2020    BUN 9 11/12/2020    CO2 25 11/12/2020    TSH 0.859 11/12/2020 "    HGBA1C 5.1 11/12/2020           Imaging reviewed:  None  Endoscopy reviewed:    None    Medical Decision Making:    Assessment:    Micaela Arreguin is a 25 y.o. female here for:    1. Constipation, unspecified constipation type    2. Lower abdominal pain    3. Diarrhea, unspecified type    4. Rectal bleeding       Pain associated with BM and does improve after BM. Will try labs and stool first along with bentyl and fiber. If no improvement will recommend scopes    Recommendations:  1. Labs and stool  2. Bentyl and fiber  3. CRS for BRBPR    F/u pending order results    Order summary:  Orders Placed This Encounter    Tissue Transglutaminase, IgA    IgA    Calprotectin    C-Reactive Protein    dicyclomine (BENTYL) 10 MG capsule         Thank you for allowing me to participate in the care of Micaela Arreguin    LARS Veliz

## 2020-12-03 NOTE — PROGRESS NOTES
CRS Office Visit History and Physical    Referring Md:   Fanny Iraheta, Timothy  [address] [place] [address]    SUBJECTIVE:     Chief Complaint: rectal bleeding    History of Present Illness:  The patient is new patient to this practice.   Course is as follows:  Patient is a 25 y.o. female presents with rectal bleeding. She experiences BRBPR on tp and in toilet bowel intermittently with a bowel movement.   Symptoms have been present for 1.5 years. When this occurs she will also experience 8/10 sharp pain. With bm's.   Has tried nothing.  Associated bleeding: yes  Previous anorectal procedures: no  confirms straining/prolonged time on toilet with bowel movements.  is not currently taking fiber supplement or stool softener. Currently having a normal bm almost daily. Although usually alternates between diarrhea and constipation.   Blood thinners: No    Last Colonoscopy: none  Family history of colorectal cancer or IBD: none.    Review of patient's allergies indicates:   Allergen Reactions    Nut - unspecified Itching and Swelling       Past Medical History:   Diagnosis Date    ADHD (attention deficit hyperactivity disorder)     Depression      Past Surgical History:   Procedure Laterality Date    WISDOM TOOTH EXTRACTION       Family History   Problem Relation Age of Onset    No Known Problems Mother     ALS Father     Breast cancer Neg Hx     Cancer Neg Hx     Colon cancer Neg Hx     Ovarian cancer Neg Hx     Celiac disease Neg Hx     Colon polyps Neg Hx     Esophageal cancer Neg Hx     Liver cancer Neg Hx     Liver disease Neg Hx     Rectal cancer Neg Hx     Stomach cancer Neg Hx      Social History     Tobacco Use    Smoking status: Never Smoker    Smokeless tobacco: Never Used   Substance Use Topics    Alcohol use: Yes     Frequency: Never     Comment: socially    Drug use: Never        Review of Systems:  Review of Systems   Constitutional: Negative for chills, fever and weight  loss.   Gastrointestinal: Positive for blood in stool, constipation and diarrhea. Negative for abdominal pain and melena.   Psychiatric/Behavioral: The patient is not nervous/anxious.    All other systems reviewed and are negative.      OBJECTIVE:     Vital Signs (Most Recent)  There were no vitals taken for this visit.    Physical Exam:  General: White female in no distress   Neuro: Alert and oriented to person, place, and time.  Moves all extremities.     HEENT: No icterus.  Trachea midline  Respiratory: Respirations are even and unlabored, no cough or audible wheezing  Abdomen: soft, round  Skin: Warm dry and intact, No visible rashes, no jaundice    Labs reviewed today:  Lab Results   Component Value Date    WBC 8.53 11/12/2020    HGB 13.1 11/12/2020    HCT 41.0 11/12/2020     (H) 11/12/2020    CHOL 176 11/12/2020    TRIG 76 11/12/2020    HDL 49 11/12/2020    ALT 18 11/12/2020    AST 21 11/12/2020     11/12/2020    K 4.1 11/12/2020     11/12/2020    CREATININE 0.8 11/12/2020    BUN 9 11/12/2020    CO2 25 11/12/2020    TSH 0.859 11/12/2020    HGBA1C 5.1 11/12/2020       Imaging reviewed today:  none    Endoscopy reviewed today:  none    Anorectal Exam:    Anal Skin: posterior midline anal fissure, small    Digital Rectal Exam:  Resting Tone normal  Squeeze normal  Relaxation with bear down present  Mass none  Rectocele  absent  Tenderness  absent    Anoscopy:  Verbal consent was obtained.   Clear plastic anoscope inserted.    Hemorrhoids  present  Stigmata of bleeding  absent  Stigmata of prolapsed  absent  Distal rectal mucosa normal      ASSESSMENT/PLAN:     Diagnoses and all orders for this visit:    Anal fissure  -     diltiazem HCl (DILTIAZEM 2% CREAM); Apply topically 3 (three) times daily. Apply topically to anal area.        The patient was instructed to:  Will try increasing fiber before trying diltiazem cream.   Increase water intake to at least 8-10 glasses of water per day.  Take a  daily fiber supplement (Konsyl, Benefiber, Metamucil) and increase dietary intake to 20-30 grams/day.  Avoid straining or spending >5min/event with bowel movements.  Follow-up in clinic prn pending stool studies.      Ivelisse Grimm, HARLEY-BENSON  Colon and Rectal Surgery

## 2020-12-07 LAB — TTG IGA SER-ACNC: 3 UNITS

## 2020-12-08 ENCOUNTER — TELEPHONE (OUTPATIENT)
Dept: GASTROENTEROLOGY | Facility: CLINIC | Age: 25
End: 2020-12-08

## 2020-12-08 NOTE — TELEPHONE ENCOUNTER
MA contacted pt to go over lab results per Fanny. Pt did not answer, MA LVM for pt to give the clinic a call.       ----- Message from Fanny Iraheta NP sent at 12/8/2020  2:41 PM CST -----  Labs for inflammation and celiac disease normal

## 2020-12-08 NOTE — TELEPHONE ENCOUNTER
MA returned pt all to give lab work. Pt did not answer, ANNA HAIRSTON explaining to pt results will be sent to portal.       ----- Message from Eileen Perez MA sent at 12/8/2020  4:40 PM CST -----  Contact: pt    ----- Message -----  From: Mckinley Aguilar  Sent: 12/8/2020   4:33 PM CST  To: [facility] Staff    Returning a phone call     Micaela [phone]

## 2021-04-28 ENCOUNTER — PATIENT MESSAGE (OUTPATIENT)
Dept: RESEARCH | Facility: HOSPITAL | Age: 26
End: 2021-04-28

## 2021-06-20 ENCOUNTER — OFFICE VISIT (OUTPATIENT)
Dept: URGENT CARE | Facility: CLINIC | Age: 26
End: 2021-06-20
Payer: COMMERCIAL

## 2021-06-20 VITALS
HEIGHT: 69 IN | BODY MASS INDEX: 35.55 KG/M2 | HEART RATE: 94 BPM | RESPIRATION RATE: 17 BRPM | WEIGHT: 240 LBS | OXYGEN SATURATION: 98 % | SYSTOLIC BLOOD PRESSURE: 118 MMHG | TEMPERATURE: 98 F | DIASTOLIC BLOOD PRESSURE: 87 MMHG

## 2021-06-20 DIAGNOSIS — Z02.89 PHYSICAL EXAM FOR CAMP: Primary | ICD-10-CM

## 2021-06-20 PROCEDURE — 99499 UNLISTED E&M SERVICE: CPT | Mod: CSM,S$GLB,, | Performed by: NURSE PRACTITIONER

## 2021-06-20 PROCEDURE — 1126F PR PAIN SEVERITY QUANTIFIED, NO PAIN PRESENT: ICD-10-PCS | Mod: S$GLB,,, | Performed by: NURSE PRACTITIONER

## 2021-06-20 PROCEDURE — 3008F PR BODY MASS INDEX (BMI) DOCUMENTED: ICD-10-PCS | Mod: CPTII,S$GLB,, | Performed by: NURSE PRACTITIONER

## 2021-06-20 PROCEDURE — 3008F BODY MASS INDEX DOCD: CPT | Mod: CPTII,S$GLB,, | Performed by: NURSE PRACTITIONER

## 2021-06-20 PROCEDURE — 1126F AMNT PAIN NOTED NONE PRSNT: CPT | Mod: S$GLB,,, | Performed by: NURSE PRACTITIONER

## 2021-06-20 PROCEDURE — 99499 PR PHYSICAL - SPORTS/SCHOOL: ICD-10-PCS | Mod: CSM,S$GLB,, | Performed by: NURSE PRACTITIONER

## 2021-06-20 PROCEDURE — 99499 UNLISTED E&M SERVICE: CPT | Mod: S$GLB,,, | Performed by: NURSE PRACTITIONER

## 2021-06-22 ENCOUNTER — PATIENT MESSAGE (OUTPATIENT)
Dept: INTERNAL MEDICINE | Facility: CLINIC | Age: 26
End: 2021-06-22

## 2021-06-23 ENCOUNTER — PATIENT MESSAGE (OUTPATIENT)
Dept: INTERNAL MEDICINE | Facility: CLINIC | Age: 26
End: 2021-06-23

## 2021-09-21 ENCOUNTER — CLINICAL SUPPORT (OUTPATIENT)
Dept: URGENT CARE | Facility: CLINIC | Age: 26
End: 2021-09-21
Payer: COMMERCIAL

## 2021-09-21 DIAGNOSIS — Z11.9 SCREENING EXAMINATION FOR UNSPECIFIED INFECTIOUS DISEASE: Primary | ICD-10-CM

## 2021-09-21 LAB
CTP QC/QA: YES
SARS-COV-2 RDRP RESP QL NAA+PROBE: NEGATIVE

## 2021-09-21 PROCEDURE — 99211 OFF/OP EST MAY X REQ PHY/QHP: CPT | Mod: S$GLB,,, | Performed by: FAMILY MEDICINE

## 2021-09-21 PROCEDURE — 99211 PR OFFICE/OUTPT VISIT, EST, LEVL I: ICD-10-PCS | Mod: S$GLB,,, | Performed by: FAMILY MEDICINE

## 2021-09-21 PROCEDURE — U0002 COVID-19 LAB TEST NON-CDC: HCPCS | Mod: QW,S$GLB,, | Performed by: FAMILY MEDICINE

## 2021-09-21 PROCEDURE — U0002: ICD-10-PCS | Mod: QW,S$GLB,, | Performed by: FAMILY MEDICINE

## 2021-09-22 ENCOUNTER — CLINICAL SUPPORT (OUTPATIENT)
Dept: URGENT CARE | Facility: CLINIC | Age: 26
End: 2021-09-22
Payer: COMMERCIAL

## 2021-09-22 ENCOUNTER — OFFICE VISIT (OUTPATIENT)
Dept: URGENT CARE | Facility: CLINIC | Age: 26
End: 2021-09-22
Payer: COMMERCIAL

## 2021-09-22 VITALS
DIASTOLIC BLOOD PRESSURE: 94 MMHG | TEMPERATURE: 98 F | HEART RATE: 78 BPM | WEIGHT: 240 LBS | SYSTOLIC BLOOD PRESSURE: 127 MMHG | OXYGEN SATURATION: 100 % | HEIGHT: 69 IN | BODY MASS INDEX: 35.55 KG/M2

## 2021-09-22 DIAGNOSIS — Z20.822 EXPOSURE TO COVID-19 VIRUS: Primary | ICD-10-CM

## 2021-09-22 DIAGNOSIS — J00 ACUTE NASOPHARYNGITIS: ICD-10-CM

## 2021-09-22 DIAGNOSIS — J02.9 SORE THROAT: ICD-10-CM

## 2021-09-22 DIAGNOSIS — Z11.9 SCREENING EXAMINATION FOR UNSPECIFIED INFECTIOUS DISEASE: ICD-10-CM

## 2021-09-22 DIAGNOSIS — Z71.89 EDUCATED ABOUT 2019 NOVEL CORONAVIRUS INFECTION: ICD-10-CM

## 2021-09-22 DIAGNOSIS — R52 BODY ACHES: ICD-10-CM

## 2021-09-22 DIAGNOSIS — Z11.9 SCREENING EXAMINATION FOR UNSPECIFIED INFECTIOUS DISEASE: Primary | ICD-10-CM

## 2021-09-22 LAB
CTP QC/QA: YES
POC MOLECULAR INFLUENZA A AGN: NEGATIVE
POC MOLECULAR INFLUENZA B AGN: NEGATIVE

## 2021-09-22 PROCEDURE — 99214 OFFICE O/P EST MOD 30 MIN: CPT | Mod: S$GLB,,, | Performed by: PHYSICIAN ASSISTANT

## 2021-09-22 PROCEDURE — 3008F BODY MASS INDEX DOCD: CPT | Mod: CPTII,S$GLB,, | Performed by: PHYSICIAN ASSISTANT

## 2021-09-22 PROCEDURE — 3074F PR MOST RECENT SYSTOLIC BLOOD PRESSURE < 130 MM HG: ICD-10-PCS | Mod: CPTII,S$GLB,, | Performed by: PHYSICIAN ASSISTANT

## 2021-09-22 PROCEDURE — 3080F DIAST BP >= 90 MM HG: CPT | Mod: CPTII,S$GLB,, | Performed by: PHYSICIAN ASSISTANT

## 2021-09-22 PROCEDURE — 87502 INFLUENZA DNA AMP PROBE: CPT | Mod: QW,S$GLB,, | Performed by: PHYSICIAN ASSISTANT

## 2021-09-22 PROCEDURE — 1159F PR MEDICATION LIST DOCUMENTED IN MEDICAL RECORD: ICD-10-PCS | Mod: CPTII,S$GLB,, | Performed by: PHYSICIAN ASSISTANT

## 2021-09-22 PROCEDURE — U0002 PR SARS-COV-2 COVID-19 ANY TECHNIQUE, MULT TYPE/SUBTYPE/TARGET: ICD-10-PCS | Mod: S$GLB,,, | Performed by: EMERGENCY MEDICINE

## 2021-09-22 PROCEDURE — 87502 POCT INFLUENZA A/B MOLECULAR: ICD-10-PCS | Mod: QW,S$GLB,, | Performed by: PHYSICIAN ASSISTANT

## 2021-09-22 PROCEDURE — 99214 PR OFFICE/OUTPT VISIT, EST, LEVL IV, 30-39 MIN: ICD-10-PCS | Mod: S$GLB,,, | Performed by: PHYSICIAN ASSISTANT

## 2021-09-22 PROCEDURE — 3008F PR BODY MASS INDEX (BMI) DOCUMENTED: ICD-10-PCS | Mod: CPTII,S$GLB,, | Performed by: PHYSICIAN ASSISTANT

## 2021-09-22 PROCEDURE — 3080F PR MOST RECENT DIASTOLIC BLOOD PRESSURE >= 90 MM HG: ICD-10-PCS | Mod: CPTII,S$GLB,, | Performed by: PHYSICIAN ASSISTANT

## 2021-09-22 PROCEDURE — U0002 COVID-19 LAB TEST NON-CDC: HCPCS | Mod: S$GLB,,, | Performed by: EMERGENCY MEDICINE

## 2021-09-22 PROCEDURE — 3074F SYST BP LT 130 MM HG: CPT | Mod: CPTII,S$GLB,, | Performed by: PHYSICIAN ASSISTANT

## 2021-09-22 PROCEDURE — 1159F MED LIST DOCD IN RCRD: CPT | Mod: CPTII,S$GLB,, | Performed by: PHYSICIAN ASSISTANT

## 2021-09-26 ENCOUNTER — CLINICAL SUPPORT (OUTPATIENT)
Dept: URGENT CARE | Facility: CLINIC | Age: 26
End: 2021-09-26
Payer: COMMERCIAL

## 2021-09-26 DIAGNOSIS — Z11.9 SCREENING EXAMINATION FOR UNSPECIFIED INFECTIOUS DISEASE: Primary | ICD-10-CM

## 2021-09-26 LAB
CTP QC/QA: YES
SARS-COV-2 RDRP RESP QL NAA+PROBE: NEGATIVE

## 2021-09-26 PROCEDURE — 99211 OFF/OP EST MAY X REQ PHY/QHP: CPT | Mod: S$GLB,CS,, | Performed by: NURSE PRACTITIONER

## 2021-09-26 PROCEDURE — 87635 SARS-COV-2 COVID-19 AMP PRB: CPT | Mod: QW,S$GLB,, | Performed by: NURSE PRACTITIONER

## 2021-09-26 PROCEDURE — 99211 PR OFFICE/OUTPT VISIT, EST, LEVL I: ICD-10-PCS | Mod: S$GLB,CS,, | Performed by: NURSE PRACTITIONER

## 2021-09-26 PROCEDURE — 87635: ICD-10-PCS | Mod: QW,S$GLB,, | Performed by: NURSE PRACTITIONER

## 2021-10-04 ENCOUNTER — PATIENT MESSAGE (OUTPATIENT)
Dept: ADMINISTRATIVE | Facility: HOSPITAL | Age: 26
End: 2021-10-04

## 2021-10-22 ENCOUNTER — CLINICAL SUPPORT (OUTPATIENT)
Dept: OTHER | Facility: CLINIC | Age: 26
End: 2021-10-22

## 2021-10-22 DIAGNOSIS — Z00.8 ENCOUNTER FOR OTHER GENERAL EXAMINATION: ICD-10-CM

## 2021-10-23 VITALS — HEIGHT: 69 IN | BODY MASS INDEX: 35.44 KG/M2

## 2021-10-23 LAB
GLUCOSE SERPL-MCNC: 89 MG/DL (ref 60–140)
HDLC SERPL-MCNC: 43 MG/DL
POC CHOLESTEROL, LDL (DOCK): 98 MG/DL
POC CHOLESTEROL, TOTAL: 167 MG/DL
TRIGL SERPL-MCNC: 132 MG/DL

## 2022-01-18 ENCOUNTER — PATIENT MESSAGE (OUTPATIENT)
Dept: ADMINISTRATIVE | Facility: HOSPITAL | Age: 27
End: 2022-01-18
Payer: COMMERCIAL

## 2022-03-04 ENCOUNTER — OFFICE VISIT (OUTPATIENT)
Dept: URGENT CARE | Facility: CLINIC | Age: 27
End: 2022-03-04
Payer: COMMERCIAL

## 2022-03-04 VITALS
RESPIRATION RATE: 18 BRPM | HEART RATE: 109 BPM | BODY MASS INDEX: 34.8 KG/M2 | WEIGHT: 235 LBS | SYSTOLIC BLOOD PRESSURE: 102 MMHG | TEMPERATURE: 98 F | HEIGHT: 69 IN | DIASTOLIC BLOOD PRESSURE: 70 MMHG | OXYGEN SATURATION: 98 %

## 2022-03-04 DIAGNOSIS — Z11.59 ENCOUNTER FOR SCREENING FOR OTHER VIRAL DISEASES: Primary | ICD-10-CM

## 2022-03-04 LAB
CTP QC/QA: YES
SARS-COV-2 RDRP RESP QL NAA+PROBE: NEGATIVE

## 2022-03-04 PROCEDURE — 3078F PR MOST RECENT DIASTOLIC BLOOD PRESSURE < 80 MM HG: ICD-10-PCS | Mod: CPTII,S$GLB,, | Performed by: NURSE PRACTITIONER

## 2022-03-04 PROCEDURE — 3008F BODY MASS INDEX DOCD: CPT | Mod: CPTII,S$GLB,, | Performed by: NURSE PRACTITIONER

## 2022-03-04 PROCEDURE — 1160F RVW MEDS BY RX/DR IN RCRD: CPT | Mod: CPTII,S$GLB,, | Performed by: NURSE PRACTITIONER

## 2022-03-04 PROCEDURE — 99213 PR OFFICE/OUTPT VISIT, EST, LEVL III, 20-29 MIN: ICD-10-PCS | Mod: S$GLB,CS,, | Performed by: NURSE PRACTITIONER

## 2022-03-04 PROCEDURE — 3008F PR BODY MASS INDEX (BMI) DOCUMENTED: ICD-10-PCS | Mod: CPTII,S$GLB,, | Performed by: NURSE PRACTITIONER

## 2022-03-04 PROCEDURE — 3074F PR MOST RECENT SYSTOLIC BLOOD PRESSURE < 130 MM HG: ICD-10-PCS | Mod: CPTII,S$GLB,, | Performed by: NURSE PRACTITIONER

## 2022-03-04 PROCEDURE — 1159F MED LIST DOCD IN RCRD: CPT | Mod: CPTII,S$GLB,, | Performed by: NURSE PRACTITIONER

## 2022-03-04 PROCEDURE — U0002 COVID-19 LAB TEST NON-CDC: HCPCS | Mod: QW,S$GLB,, | Performed by: NURSE PRACTITIONER

## 2022-03-04 PROCEDURE — 99213 OFFICE O/P EST LOW 20 MIN: CPT | Mod: S$GLB,CS,, | Performed by: NURSE PRACTITIONER

## 2022-03-04 PROCEDURE — U0002: ICD-10-PCS | Mod: QW,S$GLB,, | Performed by: NURSE PRACTITIONER

## 2022-03-04 PROCEDURE — 1159F PR MEDICATION LIST DOCUMENTED IN MEDICAL RECORD: ICD-10-PCS | Mod: CPTII,S$GLB,, | Performed by: NURSE PRACTITIONER

## 2022-03-04 PROCEDURE — 3074F SYST BP LT 130 MM HG: CPT | Mod: CPTII,S$GLB,, | Performed by: NURSE PRACTITIONER

## 2022-03-04 PROCEDURE — 3078F DIAST BP <80 MM HG: CPT | Mod: CPTII,S$GLB,, | Performed by: NURSE PRACTITIONER

## 2022-03-04 PROCEDURE — 1160F PR REVIEW ALL MEDS BY PRESCRIBER/CLIN PHARMACIST DOCUMENTED: ICD-10-PCS | Mod: CPTII,S$GLB,, | Performed by: NURSE PRACTITIONER

## 2022-03-04 RX ORDER — DESVENLAFAXINE 100 MG/1
50 TABLET, EXTENDED RELEASE ORAL
COMMUNITY
End: 2023-02-17

## 2022-03-04 NOTE — PROGRESS NOTES
"Subjective:       Patient ID: Micaela Arreguin is a 27 y.o. female.    Vitals:  height is 5' 9" (1.753 m) and weight is 106.6 kg (235 lb). Her temperature is 97.9 °F (36.6 °C). Her blood pressure is 102/70 and her pulse is 109. Her respiration is 18 and oxygen saturation is 98%.     Chief Complaint: No chief complaint on file.    Patient states she has been having congestion, post nasal drip and cough for the past 3 weeks.    URI   This is a new problem. The current episode started 1 to 4 weeks ago. The problem has been gradually worsening. Associated symptoms include congestion, coughing and rhinorrhea. Pertinent negatives include no diarrhea, ear pain, headaches, nausea, rash, sinus pain, sore throat, swollen glands, vomiting or wheezing. She has tried antihistamine for the symptoms. The treatment provided no relief.       Constitution: Negative for chills, sweating and fever.   HENT: Positive for congestion. Negative for ear pain, sinus pain, sinus pressure and sore throat.    Cardiovascular: Negative for palpitations.   Respiratory: Positive for cough. Negative for sputum production, shortness of breath, wheezing and asthma.    Gastrointestinal: Negative for nausea, vomiting and diarrhea.   Musculoskeletal: Negative for muscle ache.   Skin: Negative for color change and rash.   Allergic/Immunologic: Positive for immunizations up-to-date. Negative for environmental allergies, seasonal allergies, asthma and immunocompromised state.   Neurological: Negative for dizziness, light-headedness and headaches.   Psychiatric/Behavioral: Negative for nervous/anxious and depression. The patient is not nervous/anxious.        Objective:      Physical Exam   Constitutional: She is oriented to person, place, and time. She appears well-developed. She is cooperative.  Non-toxic appearance. She does not appear ill. No distress.   HENT:   Head: Normocephalic and atraumatic.   Ears:   Right Ear: Hearing, tympanic membrane, " external ear and ear canal normal.   Left Ear: Hearing, tympanic membrane, external ear and ear canal normal.   Nose: Mucosal edema and rhinorrhea present. No nasal deformity. No epistaxis. Right sinus exhibits no maxillary sinus tenderness and no frontal sinus tenderness. Left sinus exhibits no maxillary sinus tenderness and no frontal sinus tenderness.   Mouth/Throat: Uvula is midline, oropharynx is clear and moist and mucous membranes are normal. No trismus in the jaw. Normal dentition. No uvula swelling. No oropharyngeal exudate, posterior oropharyngeal edema or posterior oropharyngeal erythema. No tonsillar exudate.   Eyes: Conjunctivae and lids are normal. No scleral icterus.   Neck: Trachea normal and phonation normal. Neck supple. No edema present. No erythema present. No neck rigidity present.   Cardiovascular: Normal rate, regular rhythm, normal heart sounds and normal pulses.   Pulmonary/Chest: Effort normal and breath sounds normal. No respiratory distress. She has no decreased breath sounds. She has no wheezes. She has no rhonchi.   Abdominal: Normal appearance.   Musculoskeletal: Normal range of motion.         General: No deformity. Normal range of motion.   Neurological: no focal deficit. She is alert and oriented to person, place, and time. She exhibits normal muscle tone. Coordination normal.   Skin: Skin is warm, dry, intact, not diaphoretic, not pale and no rash. Capillary refill takes less than 2 seconds.   Psychiatric: Her speech is normal and behavior is normal. Judgment and thought content normal.   Nursing note and vitals reviewed.        Assessment:       1. Encounter for screening for other viral diseases          Plan:         Encounter for screening for other viral diseases  -     POCT COVID-19 Rapid Screening         Results for orders placed or performed in visit on 03/04/22   POCT COVID-19 Rapid Screening   Result Value Ref Range    POC Rapid COVID Negative Negative      Acceptable Yes        Patient Instructions     Drink plenty of fluids such as water/ GatorAide/Smart Water.  You can rotate Tylenol (1000mg) every 4hrs with Ibuprofen (600mg) every 6 hours for fever and body aches. If possible take the Ibuprofen with food or milk.    You can use a decongestant such as Sudafed or one from the shelf  that help with your symptoms. Follow instructions on the label.          You must understand that you've received an Urgent Care treatment only and that you may be released before all your medical problems are known or treated. You, the patient, will arrange for follow up care as instructed.  If your condition worsens we recommend that you receive another evaluation at the emergency room immediately or contact your primary medical clinics after hours call service to discuss your concerns.  Please return here or go to the Emergency Department for any concerns or worsening of condition.

## 2022-03-05 NOTE — PATIENT INSTRUCTIONS
Drink plenty of fluids such as water/ GatorAide/Smart Water.  You can rotate Tylenol (1000mg) every 4hrs with Ibuprofen (600mg) every 6 hours for fever and body aches. If possible take the Ibuprofen with food or milk.    You can use a decongestant such as Sudafed or one from the shelf  that help with your symptoms. Follow instructions on the label.          You must understand that you've received an Urgent Care treatment only and that you may be released before all your medical problems are known or treated. You, the patient, will arrange for follow up care as instructed.  If your condition worsens we recommend that you receive another evaluation at the emergency room immediately or contact your primary medical clinics after hours call service to discuss your concerns.  Please return here or go to the Emergency Department for any concerns or worsening of condition.

## 2022-03-16 ENCOUNTER — PATIENT MESSAGE (OUTPATIENT)
Dept: ADMINISTRATIVE | Facility: HOSPITAL | Age: 27
End: 2022-03-16
Payer: COMMERCIAL

## 2022-08-03 ENCOUNTER — LAB VISIT (OUTPATIENT)
Dept: LAB | Facility: HOSPITAL | Age: 27
End: 2022-08-03
Attending: INTERNAL MEDICINE
Payer: COMMERCIAL

## 2022-08-03 ENCOUNTER — OFFICE VISIT (OUTPATIENT)
Dept: INTERNAL MEDICINE | Facility: CLINIC | Age: 27
End: 2022-08-03
Payer: COMMERCIAL

## 2022-08-03 VITALS
SYSTOLIC BLOOD PRESSURE: 124 MMHG | TEMPERATURE: 98 F | OXYGEN SATURATION: 98 % | DIASTOLIC BLOOD PRESSURE: 86 MMHG | BODY MASS INDEX: 33.76 KG/M2 | WEIGHT: 227.94 LBS | HEIGHT: 69 IN | HEART RATE: 87 BPM

## 2022-08-03 DIAGNOSIS — Z00.00 ANNUAL PHYSICAL EXAM: ICD-10-CM

## 2022-08-03 DIAGNOSIS — Z00.00 ANNUAL PHYSICAL EXAM: Primary | ICD-10-CM

## 2022-08-03 DIAGNOSIS — H69.93 DYSFUNCTION OF BOTH EUSTACHIAN TUBES: ICD-10-CM

## 2022-08-03 DIAGNOSIS — Z12.4 SCREENING FOR CERVICAL CANCER: ICD-10-CM

## 2022-08-03 LAB
ALBUMIN SERPL BCP-MCNC: 3.9 G/DL (ref 3.5–5.2)
ALP SERPL-CCNC: 82 U/L (ref 55–135)
ALT SERPL W/O P-5'-P-CCNC: 21 U/L (ref 10–44)
ANION GAP SERPL CALC-SCNC: 11 MMOL/L (ref 8–16)
AST SERPL-CCNC: 21 U/L (ref 10–40)
BASOPHILS # BLD AUTO: 0.04 K/UL (ref 0–0.2)
BASOPHILS NFR BLD: 0.4 % (ref 0–1.9)
BILIRUB SERPL-MCNC: 0.5 MG/DL (ref 0.1–1)
BUN SERPL-MCNC: 8 MG/DL (ref 6–20)
CALCIUM SERPL-MCNC: 10 MG/DL (ref 8.7–10.5)
CHLORIDE SERPL-SCNC: 105 MMOL/L (ref 95–110)
CHOLEST SERPL-MCNC: 191 MG/DL (ref 120–199)
CHOLEST/HDLC SERPL: 3.6 {RATIO} (ref 2–5)
CO2 SERPL-SCNC: 22 MMOL/L (ref 23–29)
CREAT SERPL-MCNC: 0.9 MG/DL (ref 0.5–1.4)
DIFFERENTIAL METHOD: NORMAL
EOSINOPHIL # BLD AUTO: 0.1 K/UL (ref 0–0.5)
EOSINOPHIL NFR BLD: 0.5 % (ref 0–8)
ERYTHROCYTE [DISTWIDTH] IN BLOOD BY AUTOMATED COUNT: 12 % (ref 11.5–14.5)
EST. GFR  (NO RACE VARIABLE): >60 ML/MIN/1.73 M^2
ESTIMATED AVG GLUCOSE: 91 MG/DL (ref 68–131)
GLUCOSE SERPL-MCNC: 74 MG/DL (ref 70–110)
HBA1C MFR BLD: 4.8 % (ref 4–5.6)
HCT VFR BLD AUTO: 41.2 % (ref 37–48.5)
HDLC SERPL-MCNC: 53 MG/DL (ref 40–75)
HDLC SERPL: 27.7 % (ref 20–50)
HGB BLD-MCNC: 13.6 G/DL (ref 12–16)
IMM GRANULOCYTES # BLD AUTO: 0.02 K/UL (ref 0–0.04)
IMM GRANULOCYTES NFR BLD AUTO: 0.2 % (ref 0–0.5)
LDLC SERPL CALC-MCNC: 112.2 MG/DL (ref 63–159)
LYMPHOCYTES # BLD AUTO: 2.2 K/UL (ref 1–4.8)
LYMPHOCYTES NFR BLD: 21.6 % (ref 18–48)
MCH RBC QN AUTO: 30.4 PG (ref 27–31)
MCHC RBC AUTO-ENTMCNC: 33 G/DL (ref 32–36)
MCV RBC AUTO: 92 FL (ref 82–98)
MONOCYTES # BLD AUTO: 0.6 K/UL (ref 0.3–1)
MONOCYTES NFR BLD: 5.8 % (ref 4–15)
NEUTROPHILS # BLD AUTO: 7.4 K/UL (ref 1.8–7.7)
NEUTROPHILS NFR BLD: 71.5 % (ref 38–73)
NONHDLC SERPL-MCNC: 138 MG/DL
NRBC BLD-RTO: 0 /100 WBC
PLATELET # BLD AUTO: 358 K/UL (ref 150–450)
PMV BLD AUTO: 10.6 FL (ref 9.2–12.9)
POTASSIUM SERPL-SCNC: 4.7 MMOL/L (ref 3.5–5.1)
PROT SERPL-MCNC: 7.6 G/DL (ref 6–8.4)
RBC # BLD AUTO: 4.47 M/UL (ref 4–5.4)
SODIUM SERPL-SCNC: 138 MMOL/L (ref 136–145)
TRIGL SERPL-MCNC: 129 MG/DL (ref 30–150)
WBC # BLD AUTO: 10.28 K/UL (ref 3.9–12.7)

## 2022-08-03 PROCEDURE — 83036 HEMOGLOBIN GLYCOSYLATED A1C: CPT | Performed by: INTERNAL MEDICINE

## 2022-08-03 PROCEDURE — 99999 PR PBB SHADOW E&M-EST. PATIENT-LVL IV: CPT | Mod: PBBFAC,,, | Performed by: INTERNAL MEDICINE

## 2022-08-03 PROCEDURE — 85025 COMPLETE CBC W/AUTO DIFF WBC: CPT | Performed by: INTERNAL MEDICINE

## 2022-08-03 PROCEDURE — 80061 LIPID PANEL: CPT | Performed by: INTERNAL MEDICINE

## 2022-08-03 PROCEDURE — 1160F RVW MEDS BY RX/DR IN RCRD: CPT | Mod: CPTII,S$GLB,, | Performed by: INTERNAL MEDICINE

## 2022-08-03 PROCEDURE — 3079F PR MOST RECENT DIASTOLIC BLOOD PRESSURE 80-89 MM HG: ICD-10-PCS | Mod: CPTII,S$GLB,, | Performed by: INTERNAL MEDICINE

## 2022-08-03 PROCEDURE — 3079F DIAST BP 80-89 MM HG: CPT | Mod: CPTII,S$GLB,, | Performed by: INTERNAL MEDICINE

## 2022-08-03 PROCEDURE — 80053 COMPREHEN METABOLIC PANEL: CPT | Performed by: INTERNAL MEDICINE

## 2022-08-03 PROCEDURE — 99395 PR PREVENTIVE VISIT,EST,18-39: ICD-10-PCS | Mod: S$GLB,,, | Performed by: INTERNAL MEDICINE

## 2022-08-03 PROCEDURE — 3008F PR BODY MASS INDEX (BMI) DOCUMENTED: ICD-10-PCS | Mod: CPTII,S$GLB,, | Performed by: INTERNAL MEDICINE

## 2022-08-03 PROCEDURE — 1159F PR MEDICATION LIST DOCUMENTED IN MEDICAL RECORD: ICD-10-PCS | Mod: CPTII,S$GLB,, | Performed by: INTERNAL MEDICINE

## 2022-08-03 PROCEDURE — 1160F PR REVIEW ALL MEDS BY PRESCRIBER/CLIN PHARMACIST DOCUMENTED: ICD-10-PCS | Mod: CPTII,S$GLB,, | Performed by: INTERNAL MEDICINE

## 2022-08-03 PROCEDURE — 3008F BODY MASS INDEX DOCD: CPT | Mod: CPTII,S$GLB,, | Performed by: INTERNAL MEDICINE

## 2022-08-03 PROCEDURE — 99999 PR PBB SHADOW E&M-EST. PATIENT-LVL IV: ICD-10-PCS | Mod: PBBFAC,,, | Performed by: INTERNAL MEDICINE

## 2022-08-03 PROCEDURE — 3074F PR MOST RECENT SYSTOLIC BLOOD PRESSURE < 130 MM HG: ICD-10-PCS | Mod: CPTII,S$GLB,, | Performed by: INTERNAL MEDICINE

## 2022-08-03 PROCEDURE — 84443 ASSAY THYROID STIM HORMONE: CPT | Performed by: INTERNAL MEDICINE

## 2022-08-03 PROCEDURE — 99395 PREV VISIT EST AGE 18-39: CPT | Mod: S$GLB,,, | Performed by: INTERNAL MEDICINE

## 2022-08-03 PROCEDURE — 3074F SYST BP LT 130 MM HG: CPT | Mod: CPTII,S$GLB,, | Performed by: INTERNAL MEDICINE

## 2022-08-03 PROCEDURE — 36415 COLL VENOUS BLD VENIPUNCTURE: CPT | Mod: PO | Performed by: INTERNAL MEDICINE

## 2022-08-03 PROCEDURE — 1159F MED LIST DOCD IN RCRD: CPT | Mod: CPTII,S$GLB,, | Performed by: INTERNAL MEDICINE

## 2022-08-03 RX ORDER — NORETHINDRONE ACETATE AND ETHINYL ESTRADIOL, ETHINYL ESTRADIOL AND FERROUS FUMARATE 1MG-10(24)
KIT ORAL
COMMUNITY
End: 2023-10-09

## 2022-08-03 NOTE — PROGRESS NOTES
Subjective:       Patient ID: Micaela Arreguin is a 27 y.o. female.    Chief Complaint: Annual Exam    HPI    27 y.o. female here for annual exam.     Health Maintenance Topics with due status: Not Due       Topic Last Completion Date    TETANUS VACCINE 02/04/2019    Influenza Vaccine 09/23/2021       Health Maintenance Due   Topic Date Due    Hepatitis C Screening  Never done    HPV Vaccines (1 - 2-dose series) Never done    HIV Screening  Never done    Pap Smear  09/11/2022           Past Medical History:   Diagnosis Date    ADHD (attention deficit hyperactivity disorder)     Depression      Past Surgical History:   Procedure Laterality Date    WISDOM TOOTH EXTRACTION       Family History   Problem Relation Age of Onset    No Known Problems Mother     ALS Father     Breast cancer Neg Hx     Cancer Neg Hx     Colon cancer Neg Hx     Ovarian cancer Neg Hx     Celiac disease Neg Hx     Colon polyps Neg Hx     Esophageal cancer Neg Hx     Liver cancer Neg Hx     Liver disease Neg Hx     Rectal cancer Neg Hx     Stomach cancer Neg Hx      Social History     Socioeconomic History    Marital status: Single   Tobacco Use    Smoking status: Never Smoker    Smokeless tobacco: Never Used   Substance and Sexual Activity    Alcohol use: Yes     Comment: socially    Drug use: Never    Sexual activity: Not Currently     Birth control/protection: None     Review of patient's allergies indicates:   Allergen Reactions    Nut - unspecified Itching and Swelling       Current Outpatient Medications:     desvenlafaxine succinate (PRISTIQ) 100 MG Tb24, , Disp: , Rfl:     dextroamphetamine-amphetamine (ADDERALL XR) 30 MG 24 hr capsule, , Disp: , Rfl:     norethindrone-e.estradioL-iron (LO LOESTRIN FE) 1 mg-10 mcg (24)/10 mcg (2) Tab, , Disp: , Rfl:         Review of Systems   Constitutional: Negative for activity change and unexpected weight change.   HENT: Negative for hearing loss, rhinorrhea and  "trouble swallowing.    Eyes: Negative for discharge and visual disturbance.   Respiratory: Negative for chest tightness and wheezing.    Cardiovascular: Negative for chest pain and palpitations.   Gastrointestinal: Negative for blood in stool, constipation, diarrhea and vomiting.   Endocrine: Negative for polydipsia and polyuria.   Genitourinary: Negative for difficulty urinating, dysuria, hematuria and menstrual problem.   Musculoskeletal: Negative for arthralgias, joint swelling and neck pain.   Neurological: Negative for weakness and headaches.   Psychiatric/Behavioral: Negative for confusion and dysphoric mood.       Objective:        Vitals:    08/03/22 1354 08/03/22 1406 08/03/22 1413   BP: (!) 130/90  124/86   BP Location: Right arm     Patient Position: Sitting     BP Method: Medium (Manual)     Pulse: (!) 121 87    Temp: 97.7 °F (36.5 °C)     TempSrc: Temporal     SpO2: 98%     Weight: 103.4 kg (227 lb 15.3 oz)     Height: 5' 9" (1.753 m)         Body mass index is 33.66 kg/m².    Physical Exam  Constitutional:       General: She is not in acute distress.     Appearance: She is well-developed. She is not diaphoretic.   HENT:      Head: Normocephalic and atraumatic.      Right Ear: External ear normal.      Left Ear: External ear normal.   Eyes:      Conjunctiva/sclera: Conjunctivae normal.   Cardiovascular:      Rate and Rhythm: Normal rate and regular rhythm.   Pulmonary:      Effort: Pulmonary effort is normal.      Breath sounds: Normal breath sounds.   Abdominal:      General: Bowel sounds are normal.      Palpations: Abdomen is soft.   Musculoskeletal:      Cervical back: Neck supple. No rigidity.      Right lower leg: No edema.      Left lower leg: No edema.   Skin:     General: Skin is warm and dry.      Nails: There is no clubbing.   Neurological:      General: No focal deficit present.      Mental Status: She is alert.   Psychiatric:         Behavior: Behavior normal.         Judgment: Judgment " normal.         Assessment:     1. Annual physical exam    2. Screening for cervical cancer    3. Dysfunction of both eustachian tubes           Plan:         1. Annual physical exam  - fasting labs   - immunizations reviewed  - pap/gyn due- ordered  - CBC Auto Differential; Future  - Comprehensive Metabolic Panel; Future  - Hemoglobin A1C; Future  - Lipid Panel; Future  - TSH; Future    2. Screening for cervical cancer  - Ambulatory referral/consult to Gynecology; Future    3. Dysfunction of both eustachian tubes  - discussed management. Routine use of NSS.            Unless there are intervening problems, Follow up in about 1 year (around 8/3/2023), or if symptoms worsen or fail to improve, for annual.      Patient note was created using MModal Dictation.  Any errors in syntax or even information may not have been identified and edited on initial review prior to signing this note.

## 2022-08-04 ENCOUNTER — TELEPHONE (OUTPATIENT)
Dept: INTERNAL MEDICINE | Facility: CLINIC | Age: 27
End: 2022-08-04
Payer: COMMERCIAL

## 2022-08-04 LAB — TSH SERPL DL<=0.005 MIU/L-ACNC: 0.79 UIU/ML (ref 0.4–4)

## 2022-08-04 NOTE — TELEPHONE ENCOUNTER
----- Message from Joselyn Loera MD sent at 8/4/2022  5:19 AM CDT -----  Results released to patient portal.

## 2022-09-16 ENCOUNTER — OFFICE VISIT (OUTPATIENT)
Dept: UROLOGY | Facility: CLINIC | Age: 27
End: 2022-09-16
Payer: COMMERCIAL

## 2022-09-16 VITALS
SYSTOLIC BLOOD PRESSURE: 118 MMHG | DIASTOLIC BLOOD PRESSURE: 77 MMHG | BODY MASS INDEX: 33.62 KG/M2 | WEIGHT: 227 LBS | HEART RATE: 85 BPM | HEIGHT: 69 IN

## 2022-09-16 DIAGNOSIS — Z87.442 HISTORY OF KIDNEY STONES: ICD-10-CM

## 2022-09-16 DIAGNOSIS — R11.0 NAUSEA: ICD-10-CM

## 2022-09-16 DIAGNOSIS — R10.9 LEFT FLANK PAIN: Primary | ICD-10-CM

## 2022-09-16 PROCEDURE — 1159F MED LIST DOCD IN RCRD: CPT | Mod: CPTII,S$GLB,, | Performed by: NURSE PRACTITIONER

## 2022-09-16 PROCEDURE — 3078F PR MOST RECENT DIASTOLIC BLOOD PRESSURE < 80 MM HG: ICD-10-PCS | Mod: CPTII,S$GLB,, | Performed by: NURSE PRACTITIONER

## 2022-09-16 PROCEDURE — 3044F HG A1C LEVEL LT 7.0%: CPT | Mod: CPTII,S$GLB,, | Performed by: NURSE PRACTITIONER

## 2022-09-16 PROCEDURE — 3078F DIAST BP <80 MM HG: CPT | Mod: CPTII,S$GLB,, | Performed by: NURSE PRACTITIONER

## 2022-09-16 PROCEDURE — 99214 PR OFFICE/OUTPT VISIT, EST, LEVL IV, 30-39 MIN: ICD-10-PCS | Mod: S$GLB,,, | Performed by: NURSE PRACTITIONER

## 2022-09-16 PROCEDURE — 1160F RVW MEDS BY RX/DR IN RCRD: CPT | Mod: CPTII,S$GLB,, | Performed by: NURSE PRACTITIONER

## 2022-09-16 PROCEDURE — 1160F PR REVIEW ALL MEDS BY PRESCRIBER/CLIN PHARMACIST DOCUMENTED: ICD-10-PCS | Mod: CPTII,S$GLB,, | Performed by: NURSE PRACTITIONER

## 2022-09-16 PROCEDURE — 3008F PR BODY MASS INDEX (BMI) DOCUMENTED: ICD-10-PCS | Mod: CPTII,S$GLB,, | Performed by: NURSE PRACTITIONER

## 2022-09-16 PROCEDURE — 3074F PR MOST RECENT SYSTOLIC BLOOD PRESSURE < 130 MM HG: ICD-10-PCS | Mod: CPTII,S$GLB,, | Performed by: NURSE PRACTITIONER

## 2022-09-16 PROCEDURE — 3044F PR MOST RECENT HEMOGLOBIN A1C LEVEL <7.0%: ICD-10-PCS | Mod: CPTII,S$GLB,, | Performed by: NURSE PRACTITIONER

## 2022-09-16 PROCEDURE — 3008F BODY MASS INDEX DOCD: CPT | Mod: CPTII,S$GLB,, | Performed by: NURSE PRACTITIONER

## 2022-09-16 PROCEDURE — 87086 URINE CULTURE/COLONY COUNT: CPT | Performed by: NURSE PRACTITIONER

## 2022-09-16 PROCEDURE — 1159F PR MEDICATION LIST DOCUMENTED IN MEDICAL RECORD: ICD-10-PCS | Mod: CPTII,S$GLB,, | Performed by: NURSE PRACTITIONER

## 2022-09-16 PROCEDURE — 3074F SYST BP LT 130 MM HG: CPT | Mod: CPTII,S$GLB,, | Performed by: NURSE PRACTITIONER

## 2022-09-16 PROCEDURE — 99214 OFFICE O/P EST MOD 30 MIN: CPT | Mod: S$GLB,,, | Performed by: NURSE PRACTITIONER

## 2022-09-16 RX ORDER — ONDANSETRON HYDROCHLORIDE 8 MG/1
8 TABLET, FILM COATED ORAL EVERY 8 HOURS PRN
Qty: 10 TABLET | Refills: 0 | Status: SHIPPED | OUTPATIENT
Start: 2022-09-16 | End: 2023-10-09

## 2022-09-16 RX ORDER — TAMSULOSIN HYDROCHLORIDE 0.4 MG/1
0.4 CAPSULE ORAL DAILY
Qty: 30 CAPSULE | Refills: 0 | Status: SHIPPED | OUTPATIENT
Start: 2022-09-16 | End: 2023-10-09

## 2022-09-16 RX ORDER — KETOROLAC TROMETHAMINE 10 MG/1
10 TABLET, FILM COATED ORAL EVERY 6 HOURS
Qty: 20 TABLET | Refills: 0 | Status: SHIPPED | OUTPATIENT
Start: 2022-09-16 | End: 2022-09-21

## 2022-09-16 NOTE — PROGRESS NOTES
Subjective:      Micaela Arreguin is a 27 y.o. female who was self-referred for evaluation of her flank pain and nausea.    The patient has a history of stones- reports passing 1-2 stones about 8 years ago. No recent imaging of her kidneys. Unsure of the composition of previous stones.     She presents today reporting left flank pain (moderate-severe in intensity) as well as N/V. + sick contacts (- stomach but going around). Also with chills but denies fever. Denies dysuria, frequency, urgency and gross hematuria. Denies a history of recurrent UTIs.    The following portions of the patient's history were reviewed and updated as appropriate: allergies, current medications, past family history, past medical history, past social history, past surgical history and problem list.    Review of Systems  Constitutional: no fever, +chills  ENT: no nasal congestion or sore throat  Respiratory: no cough or shortness of breath  Cardiovascular: no chest pain or palpitations  Gastrointestinal: +N/V  Genitourinary: as per HPI  Hematologic/Lymphatic: no easy bruising or lymphadenopathy  Musculoskeletal: no arthralgias or myalgias  Neurological: no seizures or tremors  Behavioral/Psych: no auditory or visual hallucinations     Objective:   Vital Signs:  Vitals:    09/16/22 1126   BP: 118/77   Pulse: 85     Physical Exam   General: alert and oriented, no acute distress  Head: normocephalic, atraumatic  Neck: supple, normal ROM  Respiratory: Symmetric expansion, non-labored breathing  Cardiovascular: regular rate and rhythm  Abdomen: soft, non tender, non distended   Pelvic: deferred  Skin: normal coloration and turgor, no rashes, no suspicious skin lesions noted  Neuro: alert and oriented x3, no gross deficits  Psych: normal judgment and insight, normal mood/affect, and non-anxious  Left CVA tenderness    Lab Review   Urinalysis demonstrates positive for leukocytes (trace), red blood cells (trace)  Lab Results    Component Value Date    WBC 10.28 08/03/2022    HGB 13.6 08/03/2022    HCT 41.2 08/03/2022    MCV 92 08/03/2022     08/03/2022     Lab Results   Component Value Date    CREATININE 0.9 08/03/2022    BUN 8 08/03/2022       Imaging   None    Assessment:   Left flank pain  History of kidney stones  Nausea  Plan:   Micaela was seen today for new patient.    Diagnoses and all orders for this visit:    Left flank pain  -     Urine culture  -     CT Renal Stone Study ABD Pelvis WO; Future  -     tamsulosin (FLOMAX) 0.4 mg Cap; Take 1 capsule (0.4 mg total) by mouth once daily.  -     ondansetron (ZOFRAN) 8 MG tablet; Take 1 tablet (8 mg total) by mouth every 8 (eight) hours as needed for Nausea.  -     ketorolac (TORADOL) 10 mg tablet; Take 1 tablet (10 mg total) by mouth every 6 (six) hours. for 5 days    History of kidney stones  -     tamsulosin (FLOMAX) 0.4 mg Cap; Take 1 capsule (0.4 mg total) by mouth once daily.  -     ondansetron (ZOFRAN) 8 MG tablet; Take 1 tablet (8 mg total) by mouth every 8 (eight) hours as needed for Nausea.  -     ketorolac (TORADOL) 10 mg tablet; Take 1 tablet (10 mg total) by mouth every 6 (six) hours. for 5 days    Nausea  -     tamsulosin (FLOMAX) 0.4 mg Cap; Take 1 capsule (0.4 mg total) by mouth once daily.  -     ondansetron (ZOFRAN) 8 MG tablet; Take 1 tablet (8 mg total) by mouth every 8 (eight) hours as needed for Nausea.  -     ketorolac (TORADOL) 10 mg tablet; Take 1 tablet (10 mg total) by mouth every 6 (six) hours. for 5 days      Plan:  --Urine sent for culture   --CT stone study asap  --Will notify with results  --Flomax and increase fluid intake  --Toradol and zofran PRN   --Discussed indications to present to ED, including fever, intractable pain, and intractable nausea/vomitting

## 2022-09-18 LAB
BACTERIA UR CULT: NORMAL
BACTERIA UR CULT: NORMAL

## 2022-09-19 ENCOUNTER — HOSPITAL ENCOUNTER (OUTPATIENT)
Dept: RADIOLOGY | Facility: OTHER | Age: 27
Discharge: HOME | End: 2022-09-19
Attending: NURSE PRACTITIONER
Payer: COMMERCIAL

## 2022-09-19 ENCOUNTER — PATIENT MESSAGE (OUTPATIENT)
Dept: INTERNAL MEDICINE | Facility: CLINIC | Age: 27
End: 2022-09-19
Payer: COMMERCIAL

## 2022-09-19 DIAGNOSIS — R10.9 LEFT FLANK PAIN: ICD-10-CM

## 2022-09-19 PROCEDURE — 74176 CT RENAL STONE STUDY ABD PELVIS WO: ICD-10-PCS | Mod: 26,,, | Performed by: INTERNAL MEDICINE

## 2022-09-19 PROCEDURE — 74176 CT ABD & PELVIS W/O CONTRAST: CPT | Mod: 26,,, | Performed by: INTERNAL MEDICINE

## 2022-09-19 PROCEDURE — 74176 CT ABD & PELVIS W/O CONTRAST: CPT | Mod: TC

## 2022-09-20 ENCOUNTER — OFFICE VISIT (OUTPATIENT)
Dept: INTERNAL MEDICINE | Facility: CLINIC | Age: 27
End: 2022-09-20
Payer: COMMERCIAL

## 2022-09-20 ENCOUNTER — LAB VISIT (OUTPATIENT)
Dept: LAB | Facility: HOSPITAL | Age: 27
End: 2022-09-20
Attending: INTERNAL MEDICINE
Payer: COMMERCIAL

## 2022-09-20 VITALS
TEMPERATURE: 98 F | BODY MASS INDEX: 34.61 KG/M2 | HEIGHT: 69 IN | HEART RATE: 124 BPM | RESPIRATION RATE: 18 BRPM | DIASTOLIC BLOOD PRESSURE: 82 MMHG | WEIGHT: 233.69 LBS | SYSTOLIC BLOOD PRESSURE: 118 MMHG | OXYGEN SATURATION: 98 %

## 2022-09-20 DIAGNOSIS — N83.8 OVARIAN MASS, LEFT: ICD-10-CM

## 2022-09-20 DIAGNOSIS — D17.9 LIPOMA, UNSPECIFIED SITE: ICD-10-CM

## 2022-09-20 DIAGNOSIS — K76.9 LIVER LESION: ICD-10-CM

## 2022-09-20 DIAGNOSIS — R10.9 ABDOMINAL PAIN, UNSPECIFIED ABDOMINAL LOCATION: ICD-10-CM

## 2022-09-20 DIAGNOSIS — R10.9 ABDOMINAL PAIN, UNSPECIFIED ABDOMINAL LOCATION: Primary | ICD-10-CM

## 2022-09-20 LAB
BILIRUB UR QL STRIP: NEGATIVE
CLARITY UR REFRACT.AUTO: CLEAR
COLOR UR AUTO: YELLOW
GLUCOSE UR QL STRIP: NEGATIVE
HGB UR QL STRIP: NEGATIVE
KETONES UR QL STRIP: NEGATIVE
LEUKOCYTE ESTERASE UR QL STRIP: NEGATIVE
NITRITE UR QL STRIP: NEGATIVE
PH UR STRIP: 6 [PH] (ref 5–8)
PROT UR QL STRIP: NEGATIVE
SP GR UR STRIP: >1.03 (ref 1–1.03)
URN SPEC COLLECT METH UR: ABNORMAL

## 2022-09-20 PROCEDURE — 3044F PR MOST RECENT HEMOGLOBIN A1C LEVEL <7.0%: ICD-10-PCS | Mod: CPTII,S$GLB,, | Performed by: INTERNAL MEDICINE

## 2022-09-20 PROCEDURE — 99213 OFFICE O/P EST LOW 20 MIN: CPT | Mod: S$GLB,,, | Performed by: INTERNAL MEDICINE

## 2022-09-20 PROCEDURE — 3074F SYST BP LT 130 MM HG: CPT | Mod: CPTII,S$GLB,, | Performed by: INTERNAL MEDICINE

## 2022-09-20 PROCEDURE — 87086 URINE CULTURE/COLONY COUNT: CPT | Performed by: INTERNAL MEDICINE

## 2022-09-20 PROCEDURE — 99999 PR PBB SHADOW E&M-EST. PATIENT-LVL IV: CPT | Mod: PBBFAC,,, | Performed by: INTERNAL MEDICINE

## 2022-09-20 PROCEDURE — 1160F RVW MEDS BY RX/DR IN RCRD: CPT | Mod: CPTII,S$GLB,, | Performed by: INTERNAL MEDICINE

## 2022-09-20 PROCEDURE — 1159F PR MEDICATION LIST DOCUMENTED IN MEDICAL RECORD: ICD-10-PCS | Mod: CPTII,S$GLB,, | Performed by: INTERNAL MEDICINE

## 2022-09-20 PROCEDURE — 1159F MED LIST DOCD IN RCRD: CPT | Mod: CPTII,S$GLB,, | Performed by: INTERNAL MEDICINE

## 2022-09-20 PROCEDURE — 3079F PR MOST RECENT DIASTOLIC BLOOD PRESSURE 80-89 MM HG: ICD-10-PCS | Mod: CPTII,S$GLB,, | Performed by: INTERNAL MEDICINE

## 2022-09-20 PROCEDURE — 99999 PR PBB SHADOW E&M-EST. PATIENT-LVL IV: ICD-10-PCS | Mod: PBBFAC,,, | Performed by: INTERNAL MEDICINE

## 2022-09-20 PROCEDURE — 3079F DIAST BP 80-89 MM HG: CPT | Mod: CPTII,S$GLB,, | Performed by: INTERNAL MEDICINE

## 2022-09-20 PROCEDURE — 81003 URINALYSIS AUTO W/O SCOPE: CPT | Performed by: INTERNAL MEDICINE

## 2022-09-20 PROCEDURE — 3044F HG A1C LEVEL LT 7.0%: CPT | Mod: CPTII,S$GLB,, | Performed by: INTERNAL MEDICINE

## 2022-09-20 PROCEDURE — 3074F PR MOST RECENT SYSTOLIC BLOOD PRESSURE < 130 MM HG: ICD-10-PCS | Mod: CPTII,S$GLB,, | Performed by: INTERNAL MEDICINE

## 2022-09-20 PROCEDURE — 1160F PR REVIEW ALL MEDS BY PRESCRIBER/CLIN PHARMACIST DOCUMENTED: ICD-10-PCS | Mod: CPTII,S$GLB,, | Performed by: INTERNAL MEDICINE

## 2022-09-20 PROCEDURE — 3008F PR BODY MASS INDEX (BMI) DOCUMENTED: ICD-10-PCS | Mod: CPTII,S$GLB,, | Performed by: INTERNAL MEDICINE

## 2022-09-20 PROCEDURE — 99213 PR OFFICE/OUTPT VISIT, EST, LEVL III, 20-29 MIN: ICD-10-PCS | Mod: S$GLB,,, | Performed by: INTERNAL MEDICINE

## 2022-09-20 PROCEDURE — 3008F BODY MASS INDEX DOCD: CPT | Mod: CPTII,S$GLB,, | Performed by: INTERNAL MEDICINE

## 2022-09-20 RX ORDER — DESVENLAFAXINE SUCCINATE 50 MG/1
TABLET, EXTENDED RELEASE ORAL
COMMUNITY
Start: 2022-09-16

## 2022-09-20 RX ORDER — DESVENLAFAXINE SUCCINATE 25 MG/1
TABLET, EXTENDED RELEASE ORAL
COMMUNITY
Start: 2022-09-16 | End: 2023-02-17

## 2022-09-20 NOTE — PROGRESS NOTES
Subjective:       Patient ID: Micaela Arreguin is a 27 y.o. female.    Chief Complaint: Results (Ct scan results )    HPI    27-year-old female here to follow-up a CT scan.  Patient has a possible cyst within the liver and a fatty deposit on the lining of the colon.  She had a kidney stone 8 yrs ago and thought she might have a repeat stone.  This was not the case.  The pain started last Thursday.  The pain is throbbing and is sometimes sharp in nature and lasts for a while (up to 10 minutes).  No association with a BM or eating.  She has not been exercising recently.  This started during the day.  She is a  and had nausea.  No relieving or exacerbating factors.    Review of Systems      Objective:      Physical Exam  Vitals reviewed.   Constitutional:       Appearance: She is well-developed.   HENT:      Head: Normocephalic and atraumatic.      Mouth/Throat:      Pharynx: No oropharyngeal exudate.   Eyes:      General: No scleral icterus.        Right eye: No discharge.         Left eye: No discharge.      Pupils: Pupils are equal, round, and reactive to light.   Neck:      Thyroid: No thyromegaly.      Trachea: No tracheal deviation.   Cardiovascular:      Rate and Rhythm: Regular rhythm.      Heart sounds: Normal heart sounds. No murmur heard.    No friction rub. No gallop.   Pulmonary:      Effort: Pulmonary effort is normal. No respiratory distress.      Breath sounds: Normal breath sounds. No wheezing or rales.   Chest:      Chest wall: No tenderness.   Abdominal:      General: Bowel sounds are normal. There is no distension.      Palpations: Abdomen is soft. There is no mass.      Tenderness: There is abdominal tenderness in the left lower quadrant. There is no guarding or rebound. Negative signs include Hendricks's sign.   Musculoskeletal:         General: No tenderness. Normal range of motion.      Cervical back: Normal range of motion and neck supple.   Skin:     General: Skin is warm and  dry.      Coloration: Skin is not pale.      Findings: No erythema or rash.   Neurological:      Mental Status: She is alert and oriented to person, place, and time.   Psychiatric:         Behavior: Behavior normal.       Assessment:       1. Abdominal pain, unspecified abdominal location  - Urinalysis; Future  - Urine culture; Future    2. Liver lesion    3. Ovarian mass, left    4. Lipoma, unspecified site    Plan:       1. Check urinalysis coming culture.  Considering that stone fell between slices.  Pain is moving, and behaving the way nephrolithiasis has for her in the past.  2.  Counseled this is benign.  Offered ultrasound, but unclear on what the yield would be since this is such a small lesion.  Likely a benign cyst.    3. Benign and no further workup according to Radiology.    4.  Counseled patient this is a benign finding and requires no further workup.

## 2022-09-21 LAB — BACTERIA UR CULT: NO GROWTH

## 2022-09-22 ENCOUNTER — PATIENT MESSAGE (OUTPATIENT)
Dept: INTERNAL MEDICINE | Facility: CLINIC | Age: 27
End: 2022-09-22
Payer: COMMERCIAL

## 2022-09-22 ENCOUNTER — TELEPHONE (OUTPATIENT)
Dept: INTERNAL MEDICINE | Facility: CLINIC | Age: 27
End: 2022-09-22
Payer: COMMERCIAL

## 2022-09-22 DIAGNOSIS — R10.9 ABDOMINAL PAIN, UNSPECIFIED ABDOMINAL LOCATION: Primary | ICD-10-CM

## 2022-09-23 ENCOUNTER — LAB VISIT (OUTPATIENT)
Dept: LAB | Facility: HOSPITAL | Age: 27
End: 2022-09-23
Attending: INTERNAL MEDICINE
Payer: COMMERCIAL

## 2022-09-23 ENCOUNTER — TELEPHONE (OUTPATIENT)
Dept: INTERNAL MEDICINE | Facility: CLINIC | Age: 27
End: 2022-09-23
Payer: COMMERCIAL

## 2022-09-23 DIAGNOSIS — R10.9 ABDOMINAL PAIN, UNSPECIFIED ABDOMINAL LOCATION: Primary | ICD-10-CM

## 2022-09-23 DIAGNOSIS — R10.9 ABDOMINAL PAIN, UNSPECIFIED ABDOMINAL LOCATION: ICD-10-CM

## 2022-09-23 LAB
BILIRUB UR QL STRIP: NEGATIVE
CLARITY UR REFRACT.AUTO: CLEAR
COLOR UR AUTO: YELLOW
GLUCOSE UR QL STRIP: NEGATIVE
HGB UR QL STRIP: NEGATIVE
KETONES UR QL STRIP: NEGATIVE
LEUKOCYTE ESTERASE UR QL STRIP: NEGATIVE
NITRITE UR QL STRIP: NEGATIVE
PH UR STRIP: 6 [PH] (ref 5–8)
PROT UR QL STRIP: NEGATIVE
SP GR UR STRIP: 1.02 (ref 1–1.03)
URN SPEC COLLECT METH UR: NORMAL

## 2022-09-23 PROCEDURE — 87086 URINE CULTURE/COLONY COUNT: CPT | Performed by: INTERNAL MEDICINE

## 2022-09-23 PROCEDURE — 81003 URINALYSIS AUTO W/O SCOPE: CPT | Performed by: INTERNAL MEDICINE

## 2022-09-23 RX ORDER — TRAMADOL HYDROCHLORIDE 50 MG/1
50 TABLET ORAL EVERY 8 HOURS PRN
Qty: 21 EACH | Refills: 0 | Status: SHIPPED | OUTPATIENT
Start: 2022-09-23 | End: 2022-10-04 | Stop reason: SDUPTHER

## 2022-09-23 NOTE — TELEPHONE ENCOUNTER
Your urine is this.  I would recommend getting a repeat CT with without contrast.  I will place order for you.

## 2022-09-24 ENCOUNTER — PATIENT MESSAGE (OUTPATIENT)
Dept: INTERNAL MEDICINE | Facility: CLINIC | Age: 27
End: 2022-09-24
Payer: COMMERCIAL

## 2022-09-24 LAB — BACTERIA UR CULT: NORMAL

## 2022-09-30 ENCOUNTER — PATIENT MESSAGE (OUTPATIENT)
Dept: INTERNAL MEDICINE | Facility: CLINIC | Age: 27
End: 2022-09-30
Payer: COMMERCIAL

## 2022-09-30 ENCOUNTER — HOSPITAL ENCOUNTER (OUTPATIENT)
Dept: RADIOLOGY | Facility: OTHER | Age: 27
Discharge: HOME | End: 2022-09-30
Attending: INTERNAL MEDICINE
Payer: COMMERCIAL

## 2022-09-30 DIAGNOSIS — R10.9 ABDOMINAL PAIN, UNSPECIFIED ABDOMINAL LOCATION: ICD-10-CM

## 2022-09-30 PROCEDURE — 25500020 PHARM REV CODE 255: Performed by: INTERNAL MEDICINE

## 2022-09-30 PROCEDURE — 74177 CT ABD & PELVIS W/CONTRAST: CPT | Mod: 26,,, | Performed by: RADIOLOGY

## 2022-09-30 PROCEDURE — 74177 CT ABDOMEN PELVIS WITH CONTRAST: ICD-10-PCS | Mod: 26,,, | Performed by: RADIOLOGY

## 2022-09-30 PROCEDURE — 74177 CT ABD & PELVIS W/CONTRAST: CPT | Mod: TC

## 2022-09-30 PROCEDURE — A9698 NON-RAD CONTRAST MATERIALNOC: HCPCS | Performed by: INTERNAL MEDICINE

## 2022-09-30 RX ADMIN — IOHEXOL 100 ML: 350 INJECTION, SOLUTION INTRAVENOUS at 01:09

## 2022-09-30 RX ADMIN — IOHEXOL 1000 ML: 9 SOLUTION ORAL at 12:09

## 2022-10-04 ENCOUNTER — OFFICE VISIT (OUTPATIENT)
Dept: INTERNAL MEDICINE | Facility: CLINIC | Age: 27
End: 2022-10-04
Payer: COMMERCIAL

## 2022-10-04 VITALS
HEART RATE: 102 BPM | RESPIRATION RATE: 18 BRPM | HEIGHT: 69 IN | WEIGHT: 235.88 LBS | DIASTOLIC BLOOD PRESSURE: 84 MMHG | OXYGEN SATURATION: 98 % | SYSTOLIC BLOOD PRESSURE: 120 MMHG | TEMPERATURE: 97 F | BODY MASS INDEX: 34.94 KG/M2

## 2022-10-04 DIAGNOSIS — R10.32 LLQ PAIN: Primary | ICD-10-CM

## 2022-10-04 PROCEDURE — 3044F PR MOST RECENT HEMOGLOBIN A1C LEVEL <7.0%: ICD-10-PCS | Mod: CPTII,S$GLB,, | Performed by: INTERNAL MEDICINE

## 2022-10-04 PROCEDURE — 3008F PR BODY MASS INDEX (BMI) DOCUMENTED: ICD-10-PCS | Mod: CPTII,S$GLB,, | Performed by: INTERNAL MEDICINE

## 2022-10-04 PROCEDURE — 99999 PR PBB SHADOW E&M-EST. PATIENT-LVL III: CPT | Mod: PBBFAC,,, | Performed by: INTERNAL MEDICINE

## 2022-10-04 PROCEDURE — 3074F PR MOST RECENT SYSTOLIC BLOOD PRESSURE < 130 MM HG: ICD-10-PCS | Mod: CPTII,S$GLB,, | Performed by: INTERNAL MEDICINE

## 2022-10-04 PROCEDURE — 99214 OFFICE O/P EST MOD 30 MIN: CPT | Mod: S$GLB,,, | Performed by: INTERNAL MEDICINE

## 2022-10-04 PROCEDURE — 3044F HG A1C LEVEL LT 7.0%: CPT | Mod: CPTII,S$GLB,, | Performed by: INTERNAL MEDICINE

## 2022-10-04 PROCEDURE — 3079F DIAST BP 80-89 MM HG: CPT | Mod: CPTII,S$GLB,, | Performed by: INTERNAL MEDICINE

## 2022-10-04 PROCEDURE — 1159F PR MEDICATION LIST DOCUMENTED IN MEDICAL RECORD: ICD-10-PCS | Mod: CPTII,S$GLB,, | Performed by: INTERNAL MEDICINE

## 2022-10-04 PROCEDURE — 1160F PR REVIEW ALL MEDS BY PRESCRIBER/CLIN PHARMACIST DOCUMENTED: ICD-10-PCS | Mod: CPTII,S$GLB,, | Performed by: INTERNAL MEDICINE

## 2022-10-04 PROCEDURE — 1160F RVW MEDS BY RX/DR IN RCRD: CPT | Mod: CPTII,S$GLB,, | Performed by: INTERNAL MEDICINE

## 2022-10-04 PROCEDURE — 3008F BODY MASS INDEX DOCD: CPT | Mod: CPTII,S$GLB,, | Performed by: INTERNAL MEDICINE

## 2022-10-04 PROCEDURE — 3074F SYST BP LT 130 MM HG: CPT | Mod: CPTII,S$GLB,, | Performed by: INTERNAL MEDICINE

## 2022-10-04 PROCEDURE — 99999 PR PBB SHADOW E&M-EST. PATIENT-LVL III: ICD-10-PCS | Mod: PBBFAC,,, | Performed by: INTERNAL MEDICINE

## 2022-10-04 PROCEDURE — 1159F MED LIST DOCD IN RCRD: CPT | Mod: CPTII,S$GLB,, | Performed by: INTERNAL MEDICINE

## 2022-10-04 PROCEDURE — 3079F PR MOST RECENT DIASTOLIC BLOOD PRESSURE 80-89 MM HG: ICD-10-PCS | Mod: CPTII,S$GLB,, | Performed by: INTERNAL MEDICINE

## 2022-10-04 PROCEDURE — 99214 PR OFFICE/OUTPT VISIT, EST, LEVL IV, 30-39 MIN: ICD-10-PCS | Mod: S$GLB,,, | Performed by: INTERNAL MEDICINE

## 2022-10-04 RX ORDER — AMOXICILLIN AND CLAVULANATE POTASSIUM 875; 125 MG/1; MG/1
1 TABLET, FILM COATED ORAL 2 TIMES DAILY
Qty: 14 TABLET | Refills: 0 | Status: SHIPPED | OUTPATIENT
Start: 2022-10-04 | End: 2022-10-11

## 2022-10-04 RX ORDER — TRAMADOL HYDROCHLORIDE 50 MG/1
50 TABLET ORAL EVERY 8 HOURS PRN
Qty: 21 EACH | Refills: 0 | Status: SHIPPED | OUTPATIENT
Start: 2022-10-04 | End: 2023-11-30

## 2022-10-04 RX ORDER — METRONIDAZOLE 500 MG/1
500 TABLET ORAL EVERY 8 HOURS
Qty: 21 TABLET | Refills: 0 | Status: SHIPPED | OUTPATIENT
Start: 2022-10-04 | End: 2022-10-11

## 2022-10-04 NOTE — PROGRESS NOTES
Subjective:       Patient ID: Micaela Arreguin is a 27 y.o. female.    Chief Complaint: Follow-up    HPI    27-year-old female here for follow-up of pain.  She has not had the really bad pain. It has occasionally got up to a 7/10.  The pain is her left lower quadrant to the front of her abdomen.  It is below the umbilicus when it is in the front.  It stays in those two areas.    Review of Systems      Objective:      Physical Exam  Vitals reviewed.   Constitutional:       Appearance: She is well-developed.   HENT:      Head: Normocephalic and atraumatic.      Mouth/Throat:      Pharynx: No oropharyngeal exudate.   Eyes:      General: No scleral icterus.        Right eye: No discharge.         Left eye: No discharge.      Pupils: Pupils are equal, round, and reactive to light.   Neck:      Thyroid: No thyromegaly.      Trachea: No tracheal deviation.   Cardiovascular:      Rate and Rhythm: Normal rate and regular rhythm.      Heart sounds: Normal heart sounds. No murmur heard.    No friction rub. No gallop.   Pulmonary:      Effort: Pulmonary effort is normal. No respiratory distress.      Breath sounds: Normal breath sounds. No wheezing or rales.   Chest:      Chest wall: No tenderness.   Abdominal:      General: Bowel sounds are normal. There is no distension.      Palpations: Abdomen is soft. There is no mass.      Tenderness: There is abdominal tenderness in the left lower quadrant. There is no guarding or rebound.   Musculoskeletal:         General: No tenderness. Normal range of motion.      Cervical back: Normal range of motion and neck supple.   Skin:     General: Skin is warm and dry.      Coloration: Skin is not pale.      Findings: No erythema or rash.   Neurological:      Mental Status: She is alert and oriented to person, place, and time.   Psychiatric:         Behavior: Behavior normal.       Assessment:       1. LLQ pain    Plan:       1. Discussed with patient that the diagnosis is not  clear to me.  Try Augmentin 875 mg twice daily for 7 days and Flagyl 500 mg 3 times daily for 7 days for presumed infection.  The history and Physical match up without even that the CT scan does not.  Do not think that this is a stone on the examination.  If this fails to provide relief, would refer to GI.  Refill of tramadol given.

## 2022-10-16 ENCOUNTER — OFFICE VISIT (OUTPATIENT)
Dept: URGENT CARE | Facility: CLINIC | Age: 27
End: 2022-10-16
Payer: COMMERCIAL

## 2022-10-16 ENCOUNTER — PATIENT MESSAGE (OUTPATIENT)
Dept: INTERNAL MEDICINE | Facility: CLINIC | Age: 27
End: 2022-10-16
Payer: COMMERCIAL

## 2022-10-16 VITALS
WEIGHT: 230 LBS | HEART RATE: 96 BPM | OXYGEN SATURATION: 97 % | RESPIRATION RATE: 18 BRPM | HEIGHT: 69 IN | SYSTOLIC BLOOD PRESSURE: 126 MMHG | BODY MASS INDEX: 34.07 KG/M2 | DIASTOLIC BLOOD PRESSURE: 83 MMHG | TEMPERATURE: 97 F

## 2022-10-16 DIAGNOSIS — R10.9 ABDOMINAL PAIN, UNSPECIFIED ABDOMINAL LOCATION: Primary | ICD-10-CM

## 2022-10-16 DIAGNOSIS — S40.862A INSECT BITE OF LEFT UPPER ARM, INITIAL ENCOUNTER: ICD-10-CM

## 2022-10-16 DIAGNOSIS — L03.114 CELLULITIS OF LEFT UPPER EXTREMITY: Primary | ICD-10-CM
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
## 2022-10-16 PROCEDURE — 1159F MED LIST DOCD IN RCRD: CPT | Mod: CPTII,S$GLB,, | Performed by: NURSE PRACTITIONER

## 2022-10-16 PROCEDURE — 1160F RVW MEDS BY RX/DR IN RCRD: CPT | Mod: CPTII,S$GLB,, | Performed by: NURSE PRACTITIONER

## 2022-10-16 PROCEDURE — 1159F PR MEDICATION LIST DOCUMENTED IN MEDICAL RECORD: ICD-10-PCS | Mod: CPTII,S$GLB,, | Performed by: NURSE PRACTITIONER

## 2022-10-16 PROCEDURE — 1160F PR REVIEW ALL MEDS BY PRESCRIBER/CLIN PHARMACIST DOCUMENTED: ICD-10-PCS | Mod: CPTII,S$GLB,, | Performed by: NURSE PRACTITIONER

## 2022-10-16 PROCEDURE — 3079F DIAST BP 80-89 MM HG: CPT | Mod: CPTII,S$GLB,, | Performed by: NURSE PRACTITIONER

## 2022-10-16 PROCEDURE — 3074F SYST BP LT 130 MM HG: CPT | Mod: CPTII,S$GLB,, | Performed by: NURSE PRACTITIONER

## 2022-10-16 PROCEDURE — 3079F PR MOST RECENT DIASTOLIC BLOOD PRESSURE 80-89 MM HG: ICD-10-PCS | Mod: CPTII,S$GLB,, | Performed by: NURSE PRACTITIONER

## 2022-10-16 PROCEDURE — 3044F PR MOST RECENT HEMOGLOBIN A1C LEVEL <7.0%: ICD-10-PCS | Mod: CPTII,S$GLB,, | Performed by: NURSE PRACTITIONER

## 2022-10-16 PROCEDURE — 99213 PR OFFICE/OUTPT VISIT, EST, LEVL III, 20-29 MIN: ICD-10-PCS | Mod: S$GLB,,, | Performed by: NURSE PRACTITIONER

## 2022-10-16 PROCEDURE — 3044F HG A1C LEVEL LT 7.0%: CPT | Mod: CPTII,S$GLB,, | Performed by: NURSE PRACTITIONER

## 2022-10-16 PROCEDURE — 3074F PR MOST RECENT SYSTOLIC BLOOD PRESSURE < 130 MM HG: ICD-10-PCS | Mod: CPTII,S$GLB,, | Performed by: NURSE PRACTITIONER

## 2022-10-16 PROCEDURE — 99213 OFFICE O/P EST LOW 20 MIN: CPT | Mod: S$GLB,,, | Performed by: NURSE PRACTITIONER

## 2022-10-16 RX ORDER — CEPHALEXIN 500 MG/1
500 CAPSULE ORAL EVERY 12 HOURS
Qty: 14 CAPSULE | Refills: 0 | Status: SHIPPED | OUTPATIENT
Start: 2022-10-16 | End: 2022-10-23

## 2022-10-16 RX ORDER — MUPIROCIN 20 MG/G
OINTMENT TOPICAL 3 TIMES DAILY
Qty: 22 G | Refills: 0 | Status: SHIPPED | OUTPATIENT
Start: 2022-10-16 | End: 2022-10-23

## 2022-10-16 NOTE — PATIENT INSTRUCTIONS
If your condition worsens or fails to improve, we recommend that you receive another evaluation at the ER immediately, contact your PCP to discuss your concerns, or return here.  You must understand that you've received an urgent care treatment only, and that you may be released before all your medical problems are known or treated.  You, the patient, will arrange for follow-up care as instructed.     Avoid picking or manipulating the wound.  Clean the wound twice a day and put antibiotic ointment on it.  You should seek ER treatment if develop fever, increasing pain, swelling, red streaking, or other signs of increasing infection.     If you are female and on birth control pills, use additional methods to prevent pregnancy while on antibiotics and for one cycle after.  If you were given narcotics, do not drive or operate heavy equipment or machinery while taking these medications.     Tylenol or Ibuprofen can also be used as directed for pain unless you have an allergy to them or medical condition (such as stomach ulcers, kidney or liver disease, or use blood thinners, etc.) for which you should not be taking these type of medications.

## 2022-10-16 NOTE — PROGRESS NOTES
"Subjective:       Patient ID: Micaela Arreguin is a 27 y.o. female.    Vitals:  height is 5' 9" (1.753 m) and weight is 104.3 kg (230 lb). Her oral temperature is 97.3 °F (36.3 °C). Her blood pressure is 126/83 and her pulse is 96. Her respiration is 18 and oxygen saturation is 97%.     Chief Complaint: Rash (Bug bite on left upper arm that seems to be infected/irritated. - Entered by patient)    26yo female pt reports that she noticed an insect bite to L upper arm 2 days ago, reports that when she woke up this morning that there was increased swelling and hardness to area, as well as some yellow crusting and drainage.  Reports surrounding redness and mild pain.  Reports using OTC antibiotic ointment without relief.  Denies fever/chills, denies SOB or wheezing, denies n/v/d.    Rash  This is a new problem. Episode onset: 2 days ago. The problem has been gradually worsening since onset. The affected locations include the left arm. The rash is characterized by blistering, dryness, itchiness, redness, swelling and draining. It is unknown if there was an exposure to a precipitant. Pertinent negatives include no diarrhea, fever, shortness of breath or vomiting. Past treatments include anti-itch cream. The treatment provided no relief. There is no history of allergies or eczema.     Constitution: Negative for chills and fever.   Respiratory:  Negative for chest tightness, shortness of breath, stridor and wheezing.    Gastrointestinal:  Negative for nausea, vomiting and diarrhea.   Skin:  Positive for rash, skin thickening/induration and erythema.     Objective:      Physical Exam   Constitutional: She is oriented to person, place, and time. She appears well-developed.   HENT:   Head: Normocephalic and atraumatic. Head is without abrasion, without contusion and without laceration.   Ears:   Right Ear: External ear normal.   Left Ear: External ear normal.   Nose: Nose normal.   Mouth/Throat: Oropharynx is " clear and moist and mucous membranes are normal.   Eyes: Conjunctivae, EOM and lids are normal. Pupils are equal, round, and reactive to light.   Neck: Trachea normal and phonation normal. Neck supple.   Cardiovascular: Normal rate, regular rhythm and normal heart sounds.   Pulmonary/Chest: Effort normal and breath sounds normal. No stridor. No respiratory distress.   Musculoskeletal: Normal range of motion.         General: Normal range of motion.   Neurological: She is alert and oriented to person, place, and time.   Skin: Skin is warm, dry, intact, rash, pustular and abscessed. Capillary refill takes less than 2 seconds. erythema No abrasion, No burn, No bruising and No ecchymosis        Psychiatric: Her speech is normal and behavior is normal. Judgment and thought content normal.   Nursing note and vitals reviewed.            Assessment:       1. Cellulitis of left upper extremity    2. Insect bite of left upper arm, initial encounter          Plan:       Provided education on prescribed medications, recommended cool compresses and OTC antihistamines like Claritin/Zyrtec during day and Benadryl at night to help with swelling.  Provided education on return/ER precautions, recommended follow-up with PCP if symptoms do not improve.  Pt verbalized understanding and agreed to plan.      Cellulitis of left upper extremity  -     cephALEXin (KEFLEX) 500 MG capsule; Take 1 capsule (500 mg total) by mouth every 12 (twelve) hours. for 7 days  Dispense: 14 capsule; Refill: 0  -     mupirocin (BACTROBAN) 2 % ointment; Apply topically 3 (three) times daily. for 7 days  Dispense: 22 g; Refill: 0    Insect bite of left upper arm, initial encounter       Patient Instructions   If your condition worsens or fails to improve, we recommend that you receive another evaluation at the ER immediately, contact your PCP to discuss your concerns, or return here.  You must understand that you've received an urgent care treatment only, and  that you may be released before all your medical problems are known or treated.  You, the patient, will arrange for follow-up care as instructed.     Avoid picking or manipulating the wound.  Clean the wound twice a day and put antibiotic ointment on it.  You should seek ER treatment if develop fever, increasing pain, swelling, red streaking, or other signs of increasing infection.     If you are female and on birth control pills, use additional methods to prevent pregnancy while on antibiotics and for one cycle after.  If you were given narcotics, do not drive or operate heavy equipment or machinery while taking these medications.     Tylenol or Ibuprofen can also be used as directed for pain unless you have an allergy to them or medical condition (such as stomach ulcers, kidney or liver disease, or use blood thinners, etc.) for which you should not be taking these type of medications.

## 2022-10-17 ENCOUNTER — PATIENT MESSAGE (OUTPATIENT)
Dept: INTERNAL MEDICINE | Facility: CLINIC | Age: 27
End: 2022-10-17
Payer: COMMERCIAL

## 2022-10-18 ENCOUNTER — OFFICE VISIT (OUTPATIENT)
Dept: OBSTETRICS AND GYNECOLOGY | Facility: CLINIC | Age: 27
End: 2022-10-18
Attending: STUDENT IN AN ORGANIZED HEALTH CARE EDUCATION/TRAINING PROGRAM
Payer: COMMERCIAL

## 2022-10-18 VITALS
BODY MASS INDEX: 35.31 KG/M2 | WEIGHT: 238.44 LBS | SYSTOLIC BLOOD PRESSURE: 102 MMHG | DIASTOLIC BLOOD PRESSURE: 64 MMHG | HEIGHT: 69 IN

## 2022-10-18 DIAGNOSIS — Z01.419 ENCOUNTER FOR GYNECOLOGICAL EXAMINATION: Primary | ICD-10-CM

## 2022-10-18 DIAGNOSIS — R10.2 PELVIC PAIN: ICD-10-CM

## 2022-10-18 LAB
B-HCG UR QL: NEGATIVE
CTP QC/QA: YES

## 2022-10-18 PROCEDURE — 99999 PR PBB SHADOW E&M-EST. PATIENT-LVL III: ICD-10-PCS | Mod: PBBFAC,,, | Performed by: STUDENT IN AN ORGANIZED HEALTH CARE EDUCATION/TRAINING PROGRAM

## 2022-10-18 PROCEDURE — 88175 CYTOPATH C/V AUTO FLUID REDO: CPT | Performed by: STUDENT IN AN ORGANIZED HEALTH CARE EDUCATION/TRAINING PROGRAM

## 2022-10-18 PROCEDURE — 99385 PR PREVENTIVE VISIT,NEW,18-39: ICD-10-PCS | Mod: S$GLB,,, | Performed by: STUDENT IN AN ORGANIZED HEALTH CARE EDUCATION/TRAINING PROGRAM

## 2022-10-18 PROCEDURE — 3074F SYST BP LT 130 MM HG: CPT | Mod: CPTII,S$GLB,, | Performed by: STUDENT IN AN ORGANIZED HEALTH CARE EDUCATION/TRAINING PROGRAM

## 2022-10-18 PROCEDURE — 1159F PR MEDICATION LIST DOCUMENTED IN MEDICAL RECORD: ICD-10-PCS | Mod: CPTII,S$GLB,, | Performed by: STUDENT IN AN ORGANIZED HEALTH CARE EDUCATION/TRAINING PROGRAM

## 2022-10-18 PROCEDURE — 3078F PR MOST RECENT DIASTOLIC BLOOD PRESSURE < 80 MM HG: ICD-10-PCS | Mod: CPTII,S$GLB,, | Performed by: STUDENT IN AN ORGANIZED HEALTH CARE EDUCATION/TRAINING PROGRAM

## 2022-10-18 PROCEDURE — 3044F HG A1C LEVEL LT 7.0%: CPT | Mod: CPTII,S$GLB,, | Performed by: STUDENT IN AN ORGANIZED HEALTH CARE EDUCATION/TRAINING PROGRAM

## 2022-10-18 PROCEDURE — 1160F PR REVIEW ALL MEDS BY PRESCRIBER/CLIN PHARMACIST DOCUMENTED: ICD-10-PCS | Mod: CPTII,S$GLB,, | Performed by: STUDENT IN AN ORGANIZED HEALTH CARE EDUCATION/TRAINING PROGRAM

## 2022-10-18 PROCEDURE — 81025 POCT URINE PREGNANCY: ICD-10-PCS | Mod: S$GLB,,, | Performed by: STUDENT IN AN ORGANIZED HEALTH CARE EDUCATION/TRAINING PROGRAM

## 2022-10-18 PROCEDURE — 81025 URINE PREGNANCY TEST: CPT | Mod: S$GLB,,, | Performed by: STUDENT IN AN ORGANIZED HEALTH CARE EDUCATION/TRAINING PROGRAM

## 2022-10-18 PROCEDURE — 1159F MED LIST DOCD IN RCRD: CPT | Mod: CPTII,S$GLB,, | Performed by: STUDENT IN AN ORGANIZED HEALTH CARE EDUCATION/TRAINING PROGRAM

## 2022-10-18 PROCEDURE — 3044F PR MOST RECENT HEMOGLOBIN A1C LEVEL <7.0%: ICD-10-PCS | Mod: CPTII,S$GLB,, | Performed by: STUDENT IN AN ORGANIZED HEALTH CARE EDUCATION/TRAINING PROGRAM

## 2022-10-18 PROCEDURE — 99385 PREV VISIT NEW AGE 18-39: CPT | Mod: S$GLB,,, | Performed by: STUDENT IN AN ORGANIZED HEALTH CARE EDUCATION/TRAINING PROGRAM

## 2022-10-18 PROCEDURE — 1160F RVW MEDS BY RX/DR IN RCRD: CPT | Mod: CPTII,S$GLB,, | Performed by: STUDENT IN AN ORGANIZED HEALTH CARE EDUCATION/TRAINING PROGRAM

## 2022-10-18 PROCEDURE — 3078F DIAST BP <80 MM HG: CPT | Mod: CPTII,S$GLB,, | Performed by: STUDENT IN AN ORGANIZED HEALTH CARE EDUCATION/TRAINING PROGRAM

## 2022-10-18 PROCEDURE — 99999 PR PBB SHADOW E&M-EST. PATIENT-LVL III: CPT | Mod: PBBFAC,,, | Performed by: STUDENT IN AN ORGANIZED HEALTH CARE EDUCATION/TRAINING PROGRAM

## 2022-10-18 PROCEDURE — 3074F PR MOST RECENT SYSTOLIC BLOOD PRESSURE < 130 MM HG: ICD-10-PCS | Mod: CPTII,S$GLB,, | Performed by: STUDENT IN AN ORGANIZED HEALTH CARE EDUCATION/TRAINING PROGRAM

## 2022-10-18 NOTE — PROGRESS NOTES
Chief Complaint: Well Woman Exam, Pelvic Pain     HPI:      Micaela Arreguin is a 27 y.o.  who presents today for well woman exam.  LMP: Patient's last menstrual period was 10/16/2022.  Regular cycles, on OCPs. Also complaining of left lower pelvic pain for the past month, pain is relatively constant, has some relief with tramadol. Had a CT scan thinking it was a kidney stone because she has a history of stones -- CT was negative. Did show small functional left ovarian cyst, and patient concerned that this could be the cause of her pain. UA at that time was also negative. Otherwise patient denies abnormal vaginal bleeding, discharge, urinary problems, or changes in appetite. Reports regular bowel habits with no constipation/diarrhea.   Ms. Arreguin is not currently sexually active. She is currently using oral contraceptives (estrogen/progesterone) for contraception. She declines STD screening today.    Previous Pap: NILM (2019)    Gardasil:Completed     Past Medical History:   Diagnosis Date    ADHD (attention deficit hyperactivity disorder)     Depression      Past Surgical History:   Procedure Laterality Date    WISDOM TOOTH EXTRACTION       Review of patient's allergies indicates:   Allergen Reactions    Nut - unspecified Itching and Swelling     Cancer-related family history is negative for Breast cancer, Cancer, Ovarian cancer, Esophageal cancer, Liver cancer, and Rectal cancer.    OB History          0    Para   0    Term   0       0    AB   0    Living   0         SAB   0    IAB   0    Ectopic   0    Multiple   0    Live Births   0                 ROS:     GENERAL: Denies unintentional weight gain or weight loss. Feeling well overall.   SKIN: Denies rash or lesions.   HEENT: Denies headaches, or vision changes.   CARDIOVASCULAR: Denies palpitations or chest pain.   RESPIRATORY: Denies shortness of breath or dyspnea on exertion.  BREASTS: Denies lumps or nipple  "discharge.   ABDOMEN: Denies constipation, diarrhea, nausea, vomiting, change in appetite.  URINARY: Denies frequency, dysuria.  NEUROLOGIC: Denies syncope or weakness.   PSYCHIATRIC: Denies uncontrolled depression or anxiety.    Physical Exam:      PHYSICAL EXAM:  /64   Ht 5' 9" (1.753 m)   Wt 108.2 kg (238 lb 6.8 oz)   LMP 10/16/2022   BMI 35.21 kg/m²   Body mass index is 35.21 kg/m².     APPEARANCE: Well nourished, well developed, in no acute distress.  PSYCH: Appropriate mood and affect.  SKIN: No acne or hirsutism  NECK: Neck symmetric without masses  NODES: No inguinal, axillary, lymph node enlargement  ABDOMEN: Soft.  No tenderness or masses.  No rebound/guarding.  CARDIOVASCULAR: No edema of peripheral extremities  BREASTS: Symmetrical, no visible skin lesions. No palpable masses. No nipple discharge bilaterally.  PELVIC: Normal external genitalia without lesions.  Normal hair distribution.  Adequate perineal body, normal urethral meatus.  Vagina moist and well rugated. Without lesions. Without discharge, +scant menstrual blood.  Cervix pink, without lesions, discharge or tenderness.  No significant cystocele or rectocele.  Bimanual exam shows uterus to be normal size, regular, mobile and nontender.  Adnexa without masses or tenderness.      Assessment/Plan:     Encounter for gynecological examination  -     Liquid-Based Pap Smear, Screening  - pelvic exam normal  - pap collected  - declines STD screening  - pelvic US ordered for pelvic pain  - UPT ordered  - routine labs with PCP    Pelvic pain  -     US Pelvis Comp with Transvag NON-OB (xpd; Future  -     POCT Urine Pregnancy      Follow up in about 1 year (around 10/18/2023) for annual.    Counseling:     Patient was counseled today on current ASCCP pap guidelines, the recommendation for yearly physical exams, safe driving habits, breast self awareness. She is to see her PCP for other health maintenance.     Use of the [facility] Patient Portal " discussed and encouraged during today's visit.     Alisha Diallo MD  Obstetrics and Gynecology  [facility]

## 2022-10-21 LAB
FINAL PATHOLOGIC DIAGNOSIS: NORMAL
Lab: NORMAL

## 2022-11-02 ENCOUNTER — PATIENT MESSAGE (OUTPATIENT)
Dept: OBSTETRICS AND GYNECOLOGY | Facility: CLINIC | Age: 27
End: 2022-11-02
Payer: COMMERCIAL

## 2022-11-02 ENCOUNTER — HOSPITAL ENCOUNTER (OUTPATIENT)
Dept: RADIOLOGY | Facility: OTHER | Age: 27
Discharge: HOME | End: 2022-11-02
Attending: STUDENT IN AN ORGANIZED HEALTH CARE EDUCATION/TRAINING PROGRAM
Payer: COMMERCIAL

## 2022-11-02 DIAGNOSIS — R10.2 PELVIC PAIN: ICD-10-CM

## 2022-11-02 PROCEDURE — 76856 US EXAM PELVIC COMPLETE: CPT | Mod: 26,,, | Performed by: RADIOLOGY

## 2022-11-02 PROCEDURE — 76856 US PELVIS COMP WITH TRANSVAG NON-OB (XPD): ICD-10-PCS | Mod: 26,,, | Performed by: RADIOLOGY

## 2022-11-02 PROCEDURE — 76830 TRANSVAGINAL US NON-OB: CPT | Mod: TC

## 2022-11-02 PROCEDURE — 76830 TRANSVAGINAL US NON-OB: CPT | Mod: 26,,, | Performed by: RADIOLOGY

## 2022-11-02 PROCEDURE — 76830 US PELVIS COMP WITH TRANSVAG NON-OB (XPD): ICD-10-PCS | Mod: 26,,, | Performed by: RADIOLOGY

## 2022-11-14 ENCOUNTER — CLINICAL SUPPORT (OUTPATIENT)
Dept: OTHER | Facility: CLINIC | Age: 27
End: 2022-11-14
Payer: COMMERCIAL

## 2022-11-14 DIAGNOSIS — Z00.8 ENCOUNTER FOR OTHER GENERAL EXAMINATION: ICD-10-CM

## 2022-11-15 VITALS
BODY MASS INDEX: 34.21 KG/M2 | WEIGHT: 231 LBS | HEIGHT: 69 IN | SYSTOLIC BLOOD PRESSURE: 134 MMHG | DIASTOLIC BLOOD PRESSURE: 84 MMHG

## 2022-11-15 LAB
GLUCOSE SERPL-MCNC: 93 MG/DL (ref 60–140)
HDLC SERPL-MCNC: 50 MG/DL
POC CHOLESTEROL, LDL (DOCK): 109 MG/DL
POC CHOLESTEROL, TOTAL: 181 MG/DL
TRIGL SERPL-MCNC: 121 MG/DL

## 2023-02-17 ENCOUNTER — TELEPHONE (OUTPATIENT)
Dept: OBSTETRICS AND GYNECOLOGY | Facility: CLINIC | Age: 28
End: 2023-02-17
Payer: COMMERCIAL

## 2023-02-17 ENCOUNTER — OFFICE VISIT (OUTPATIENT)
Dept: OBSTETRICS AND GYNECOLOGY | Facility: CLINIC | Age: 28
End: 2023-02-17
Attending: STUDENT IN AN ORGANIZED HEALTH CARE EDUCATION/TRAINING PROGRAM
Payer: COMMERCIAL

## 2023-02-17 VITALS
DIASTOLIC BLOOD PRESSURE: 72 MMHG | SYSTOLIC BLOOD PRESSURE: 120 MMHG | WEIGHT: 237.31 LBS | BODY MASS INDEX: 35.15 KG/M2 | HEIGHT: 69 IN

## 2023-02-17 DIAGNOSIS — Z87.42 HISTORY OF OVARIAN CYST: Primary | ICD-10-CM

## 2023-02-17 DIAGNOSIS — Z30.09 ENCOUNTER FOR COUNSELING REGARDING CONTRACEPTION: ICD-10-CM

## 2023-02-17 DIAGNOSIS — R10.2 PELVIC PAIN: Primary | ICD-10-CM

## 2023-02-17 PROCEDURE — 99213 PR OFFICE/OUTPT VISIT, EST, LEVL III, 20-29 MIN: ICD-10-PCS | Mod: S$GLB,,, | Performed by: STUDENT IN AN ORGANIZED HEALTH CARE EDUCATION/TRAINING PROGRAM

## 2023-02-17 PROCEDURE — 3008F PR BODY MASS INDEX (BMI) DOCUMENTED: ICD-10-PCS | Mod: CPTII,S$GLB,, | Performed by: STUDENT IN AN ORGANIZED HEALTH CARE EDUCATION/TRAINING PROGRAM

## 2023-02-17 PROCEDURE — 99999 PR PBB SHADOW E&M-EST. PATIENT-LVL III: CPT | Mod: PBBFAC,,, | Performed by: STUDENT IN AN ORGANIZED HEALTH CARE EDUCATION/TRAINING PROGRAM

## 2023-02-17 PROCEDURE — 3074F SYST BP LT 130 MM HG: CPT | Mod: CPTII,S$GLB,, | Performed by: STUDENT IN AN ORGANIZED HEALTH CARE EDUCATION/TRAINING PROGRAM

## 2023-02-17 PROCEDURE — 1159F MED LIST DOCD IN RCRD: CPT | Mod: CPTII,S$GLB,, | Performed by: STUDENT IN AN ORGANIZED HEALTH CARE EDUCATION/TRAINING PROGRAM

## 2023-02-17 PROCEDURE — 1160F PR REVIEW ALL MEDS BY PRESCRIBER/CLIN PHARMACIST DOCUMENTED: ICD-10-PCS | Mod: CPTII,S$GLB,, | Performed by: STUDENT IN AN ORGANIZED HEALTH CARE EDUCATION/TRAINING PROGRAM

## 2023-02-17 PROCEDURE — 99213 OFFICE O/P EST LOW 20 MIN: CPT | Mod: S$GLB,,, | Performed by: STUDENT IN AN ORGANIZED HEALTH CARE EDUCATION/TRAINING PROGRAM

## 2023-02-17 PROCEDURE — 3078F DIAST BP <80 MM HG: CPT | Mod: CPTII,S$GLB,, | Performed by: STUDENT IN AN ORGANIZED HEALTH CARE EDUCATION/TRAINING PROGRAM

## 2023-02-17 PROCEDURE — 1159F PR MEDICATION LIST DOCUMENTED IN MEDICAL RECORD: ICD-10-PCS | Mod: CPTII,S$GLB,, | Performed by: STUDENT IN AN ORGANIZED HEALTH CARE EDUCATION/TRAINING PROGRAM

## 2023-02-17 PROCEDURE — 3078F PR MOST RECENT DIASTOLIC BLOOD PRESSURE < 80 MM HG: ICD-10-PCS | Mod: CPTII,S$GLB,, | Performed by: STUDENT IN AN ORGANIZED HEALTH CARE EDUCATION/TRAINING PROGRAM

## 2023-02-17 PROCEDURE — 99999 PR PBB SHADOW E&M-EST. PATIENT-LVL III: ICD-10-PCS | Mod: PBBFAC,,, | Performed by: STUDENT IN AN ORGANIZED HEALTH CARE EDUCATION/TRAINING PROGRAM

## 2023-02-17 PROCEDURE — 1160F RVW MEDS BY RX/DR IN RCRD: CPT | Mod: CPTII,S$GLB,, | Performed by: STUDENT IN AN ORGANIZED HEALTH CARE EDUCATION/TRAINING PROGRAM

## 2023-02-17 PROCEDURE — 3074F PR MOST RECENT SYSTOLIC BLOOD PRESSURE < 130 MM HG: ICD-10-PCS | Mod: CPTII,S$GLB,, | Performed by: STUDENT IN AN ORGANIZED HEALTH CARE EDUCATION/TRAINING PROGRAM

## 2023-02-17 PROCEDURE — 3008F BODY MASS INDEX DOCD: CPT | Mod: CPTII,S$GLB,, | Performed by: STUDENT IN AN ORGANIZED HEALTH CARE EDUCATION/TRAINING PROGRAM

## 2023-02-17 NOTE — PROGRESS NOTES
"Chief Complaint: Follow up pelvic pain, contraception      HPI:      Micaela Arreguin is a 28 y.o.  who presents today for follow up of pelvic pain and 3 cm left para ovarian cyst. Pt reports her pain has improved overall but she does continue to have intermittent throbbing-like pain, worse on right. Having regular bowel movements. Denies any urinary symptoms/complaints. Was on Lo Loestrin but reports she recently stopped it - is interested in another form on birth control but not the IUD. No other complaints today.     Physical Exam:      PHYSICAL EXAM:  /72 (BP Location: Left arm, Patient Position: Sitting, BP Method: Medium (Manual))   Ht 5' 9" (1.753 m)   Wt 107.6 kg (237 lb 5.2 oz)   LMP 2023 (Exact Date)   BMI 35.05 kg/m²   Body mass index is 35.05 kg/m².     APPEARANCE: Well nourished, well developed, in no acute distress.  PELVIC:  External genitalia and urethra within normal limits. Vagina without lesions, without discharge, without erythema, without ulcers.  Cervix without cervical motion tenderness, non-friable. Uterus: normal size, mobile, non-tender. No adnexal masses or tenderness palpated.    Assessment/Plan:     Pelvic pain    Encounter for counseling regarding contraception      - pelvic exam normal  - reviewed prior US findings, 3 cm left para ovarian cyst  - will order/schedule repeat pelvic US to follow up cyst given still symptomatic  - discussed other contraceptive forms, including patch, ring, nexplanon, injections, IUD - pt interested in nexplanon, will let us know if she decides on this  - pain, bleeding precautions were provided  - will follow up with patient following pelvic US    Alisha Diallo MD  Obstetrics and Gynecology  [facility]      "

## 2023-05-22 ENCOUNTER — TELEPHONE (OUTPATIENT)
Dept: OBSTETRICS AND GYNECOLOGY | Facility: CLINIC | Age: 28
End: 2023-05-22
Payer: COMMERCIAL

## 2023-05-22 DIAGNOSIS — N93.9 ABNORMAL UTERINE BLEEDING (AUB): Primary | ICD-10-CM

## 2023-05-23 NOTE — TELEPHONE ENCOUNTER
Dr Diallo pt called says she's been on her period for 2 weeks and feels she has a ovarian cyst. She would like to discuss      5/24/23 @ 2676 Returned Pt's Call. Pt states she has been on her period for 2 weeks (this has never happened before) states she has an ovarian cyst. Reports pain on the Left side (where the ovarian cyst is located) but only on day 1. Reports it seems to be like a regular period but does seem to be a bit heavier, denies saturating pads every hour. Pt given ED instructions regarding bleeding, pain etc. Instructed pt I will discuss with Dr Diallo and follow with her recommendations. Pt verbalizes understanding.       5/24/23 @ [id number]  Called pt to let her know she has been scheduled for [facility] for an [facility] at 2pm and to see Dr Diallo at 2:45. Pt didn't not answer left message and will send a portal message.

## 2023-05-24 ENCOUNTER — PATIENT MESSAGE (OUTPATIENT)
Dept: OBSTETRICS AND GYNECOLOGY | Facility: CLINIC | Age: 28
End: 2023-05-24
Payer: COMMERCIAL

## 2023-05-25 ENCOUNTER — OFFICE VISIT (OUTPATIENT)
Dept: OBSTETRICS AND GYNECOLOGY | Facility: CLINIC | Age: 28
End: 2023-05-25
Attending: STUDENT IN AN ORGANIZED HEALTH CARE EDUCATION/TRAINING PROGRAM
Payer: COMMERCIAL

## 2023-05-25 VITALS
DIASTOLIC BLOOD PRESSURE: 70 MMHG | WEIGHT: 236.13 LBS | BODY MASS INDEX: 34.97 KG/M2 | HEIGHT: 69 IN | SYSTOLIC BLOOD PRESSURE: 120 MMHG

## 2023-05-25 DIAGNOSIS — N83.202 CYST OF LEFT OVARY: ICD-10-CM

## 2023-05-25 DIAGNOSIS — N93.9 ABNORMAL UTERINE BLEEDING (AUB): Primary | ICD-10-CM

## 2023-05-25 PROCEDURE — 99213 OFFICE O/P EST LOW 20 MIN: CPT | Mod: S$GLB,,, | Performed by: STUDENT IN AN ORGANIZED HEALTH CARE EDUCATION/TRAINING PROGRAM

## 2023-05-25 PROCEDURE — 3074F SYST BP LT 130 MM HG: CPT | Mod: CPTII,S$GLB,, | Performed by: STUDENT IN AN ORGANIZED HEALTH CARE EDUCATION/TRAINING PROGRAM

## 2023-05-25 PROCEDURE — 1159F PR MEDICATION LIST DOCUMENTED IN MEDICAL RECORD: ICD-10-PCS | Mod: CPTII,S$GLB,, | Performed by: STUDENT IN AN ORGANIZED HEALTH CARE EDUCATION/TRAINING PROGRAM

## 2023-05-25 PROCEDURE — 3008F PR BODY MASS INDEX (BMI) DOCUMENTED: ICD-10-PCS | Mod: CPTII,S$GLB,, | Performed by: STUDENT IN AN ORGANIZED HEALTH CARE EDUCATION/TRAINING PROGRAM

## 2023-05-25 PROCEDURE — 1160F RVW MEDS BY RX/DR IN RCRD: CPT | Mod: CPTII,S$GLB,, | Performed by: STUDENT IN AN ORGANIZED HEALTH CARE EDUCATION/TRAINING PROGRAM

## 2023-05-25 PROCEDURE — 3078F DIAST BP <80 MM HG: CPT | Mod: CPTII,S$GLB,, | Performed by: STUDENT IN AN ORGANIZED HEALTH CARE EDUCATION/TRAINING PROGRAM

## 2023-05-25 PROCEDURE — 87591 N.GONORRHOEAE DNA AMP PROB: CPT | Performed by: STUDENT IN AN ORGANIZED HEALTH CARE EDUCATION/TRAINING PROGRAM

## 2023-05-25 PROCEDURE — 1160F PR REVIEW ALL MEDS BY PRESCRIBER/CLIN PHARMACIST DOCUMENTED: ICD-10-PCS | Mod: CPTII,S$GLB,, | Performed by: STUDENT IN AN ORGANIZED HEALTH CARE EDUCATION/TRAINING PROGRAM

## 2023-05-25 PROCEDURE — 99999 PR PBB SHADOW E&M-EST. PATIENT-LVL III: ICD-10-PCS | Mod: PBBFAC,,, | Performed by: STUDENT IN AN ORGANIZED HEALTH CARE EDUCATION/TRAINING PROGRAM

## 2023-05-25 PROCEDURE — 99213 PR OFFICE/OUTPT VISIT, EST, LEVL III, 20-29 MIN: ICD-10-PCS | Mod: S$GLB,,, | Performed by: STUDENT IN AN ORGANIZED HEALTH CARE EDUCATION/TRAINING PROGRAM

## 2023-05-25 PROCEDURE — 3074F PR MOST RECENT SYSTOLIC BLOOD PRESSURE < 130 MM HG: ICD-10-PCS | Mod: CPTII,S$GLB,, | Performed by: STUDENT IN AN ORGANIZED HEALTH CARE EDUCATION/TRAINING PROGRAM

## 2023-05-25 PROCEDURE — 3008F BODY MASS INDEX DOCD: CPT | Mod: CPTII,S$GLB,, | Performed by: STUDENT IN AN ORGANIZED HEALTH CARE EDUCATION/TRAINING PROGRAM

## 2023-05-25 PROCEDURE — 81514 NFCT DS BV&VAGINITIS DNA ALG: CPT | Performed by: STUDENT IN AN ORGANIZED HEALTH CARE EDUCATION/TRAINING PROGRAM

## 2023-05-25 PROCEDURE — 99999 PR PBB SHADOW E&M-EST. PATIENT-LVL III: CPT | Mod: PBBFAC,,, | Performed by: STUDENT IN AN ORGANIZED HEALTH CARE EDUCATION/TRAINING PROGRAM

## 2023-05-25 PROCEDURE — 3078F PR MOST RECENT DIASTOLIC BLOOD PRESSURE < 80 MM HG: ICD-10-PCS | Mod: CPTII,S$GLB,, | Performed by: STUDENT IN AN ORGANIZED HEALTH CARE EDUCATION/TRAINING PROGRAM

## 2023-05-25 PROCEDURE — 1159F MED LIST DOCD IN RCRD: CPT | Mod: CPTII,S$GLB,, | Performed by: STUDENT IN AN ORGANIZED HEALTH CARE EDUCATION/TRAINING PROGRAM

## 2023-05-25 RX ORDER — NORETHINDRONE ACETATE AND ETHINYL ESTRADIOL 1MG-20(21)
1 KIT ORAL DAILY
Qty: 84 TABLET | Refills: 3 | Status: SHIPPED | OUTPATIENT
Start: 2023-05-25 | End: 2023-10-09

## 2023-05-25 NOTE — PROGRESS NOTES
"Chief Complaint: AUB, pelvic pain     HPI:      Micaela Arreguin is a 28 y.o.  who presents today for AUB. Has been on her cycle for 2 weeks with associated pelvic pain that is worse on the left side. Bleeding moderate to heavy. Feels similar to when she had an ovarian cyst in the past. Currently not on any contraception. Cycles were previously regular. Presents today for evaluation.  Denies fever, chills, nausea, vomiting, GI or urinary issues.    Physical Exam:      PHYSICAL EXAM:  /70 (BP Location: Left arm, Patient Position: Sitting, BP Method: Medium (Manual))   Ht 5' 9" (1.753 m)   Wt 107.1 kg (236 lb 1.8 oz)   LMP  (LMP Unknown)   BMI 34.87 kg/m²   Body mass index is 34.87 kg/m².     APPEARANCE: Well nourished, well developed, in no acute distress.  PELVIC:  External genitalia and urethra within normal limits. Vagina without lesions, without discharge, without erythema, without ulcers.  Cervix without cervical motion tenderness, non-friable. Uterus: normal size, mobile, non-tender.  No adnexal masses or tenderness palpated.    Results:     Pelvic US today  FINDINGS:  The uterus measures 6.8 x 4.0 x 3.8 cm, the endometrium 14.9 mm.     Right ovary measures 3.7 x 2.0 x 3.2 cm, resistive index 0.5.     Left ovary measures 5.4 x 1.8 x 5.9 cm, resistive index 0.59.     There is a left para ovarian cyst measuring 3.4 cm.  This is simple.     There is a solid complex area of the left ovary measuring 1.9 cm.     Impression:     Left para ovarian cyst.     Solid mass lesion left ovary measuring 1.9 cm.  This is probably small hemorrhagic ovarian cyst    Assessment/Plan:     Abnormal uterine bleeding (AUB)  -     C. trachomatis/N. gonorrhoeae by AMP DNA [facility]; Cervix  -     Vaginosis Screen by DNA Probe  -     norethindrone-ethinyl estradiol (JUNEL FE 1/20) 1 mg-20 mcg (21)/75 mg (7) per tablet; Take 1 tablet by mouth once daily.  Dispense: 84 tablet; Refill: 3    Cyst of left ovary  - "     norethindrone-ethinyl estradiol (JUNEL FE 1/20) 1 mg-20 mcg (21)/75 mg (7) per tablet; Take 1 tablet by mouth once daily.  Dispense: 84 tablet; Refill: 3      - Discussed possible reasons for AUB, including but not limited to hormonal, anovulatory, uterine pathology, vaginal infection   - Reviewed above pelvic ultrasound result, stable 2cm left ovarian cyst  - Discussed management options including contraceptive options to regulate cycle with added benefit of cyst prevention, desires to restart birth control pills  - Reviewed contraindications to OCPs, denies  - Discussed proper usage, including taking pill at the same time daily for greatest efficacy, to initiate pill with first day of next cycle, what to do in the event of missed pill (s)  - Reviewed side effects and risk profile, she voiced understanding - rx to pharmacy  - Will follow up AUB/pelvic pain in 6 months or sooner if needed       Alisha Diallo MD  Obstetrics and Gynecology  [facility]

## 2023-05-26 LAB
BACTERIAL VAGINOSIS DNA: NEGATIVE
C TRACH DNA SPEC QL NAA+PROBE: NOT DETECTED
CANDIDA GLABRATA DNA: NEGATIVE
CANDIDA KRUSEI DNA: NEGATIVE
CANDIDA RRNA VAG QL PROBE: NEGATIVE
N GONORRHOEA DNA SPEC QL NAA+PROBE: NOT DETECTED
T VAGINALIS RRNA GENITAL QL PROBE: NEGATIVE

## 2023-10-05 ENCOUNTER — OFFICE VISIT (OUTPATIENT)
Dept: URGENT CARE | Facility: CLINIC | Age: 28
End: 2023-10-05
Payer: COMMERCIAL

## 2023-10-05 VITALS
TEMPERATURE: 99 F | HEIGHT: 69 IN | OXYGEN SATURATION: 97 % | HEART RATE: 76 BPM | BODY MASS INDEX: 34.96 KG/M2 | RESPIRATION RATE: 16 BRPM | DIASTOLIC BLOOD PRESSURE: 85 MMHG | WEIGHT: 236 LBS | SYSTOLIC BLOOD PRESSURE: 126 MMHG

## 2023-10-05 DIAGNOSIS — M25.531 RIGHT WRIST PAIN: Primary | ICD-10-CM

## 2023-10-05 DIAGNOSIS — S60.211A CONTUSION OF RIGHT WRIST, INITIAL ENCOUNTER: ICD-10-CM

## 2023-10-05 PROBLEM — F32.A DEPRESSION: Status: ACTIVE | Noted: 2023-10-05

## 2023-10-05 PROBLEM — N83.202 CYST OF LEFT OVARY: Status: ACTIVE | Noted: 2022-09-20

## 2023-10-05 PROBLEM — F90.2 ATTENTION DEFICIT HYPERACTIVITY DISORDER (ADHD), COMBINED TYPE: Status: ACTIVE | Noted: 2023-10-05

## 2023-10-05 PROBLEM — N93.9 ABNORMAL UTERINE BLEEDING: Status: ACTIVE | Noted: 2023-10-05

## 2023-10-05 PROCEDURE — 73110 XR WRIST COMPLETE 3 VIEWS RIGHT: ICD-10-PCS | Mod: FY,RT,S$GLB, | Performed by: RADIOLOGY

## 2023-10-05 PROCEDURE — 73110 X-RAY EXAM OF WRIST: CPT | Mod: FY,RT,S$GLB, | Performed by: RADIOLOGY

## 2023-10-05 PROCEDURE — 99213 PR OFFICE/OUTPT VISIT, EST, LEVL III, 20-29 MIN: ICD-10-PCS | Mod: S$GLB,,, | Performed by: PHYSICIAN ASSISTANT

## 2023-10-05 PROCEDURE — 99213 OFFICE O/P EST LOW 20 MIN: CPT | Mod: S$GLB,,, | Performed by: PHYSICIAN ASSISTANT

## 2023-10-05 RX ORDER — LISDEXAMFETAMINE DIMESYLATE 40 MG/1
40 CAPSULE ORAL
COMMUNITY
Start: 2023-09-13

## 2023-10-05 RX ORDER — TRAZODONE HYDROCHLORIDE 50 MG/1
TABLET ORAL
COMMUNITY
Start: 2023-09-13

## 2023-10-05 RX ORDER — CEFDINIR 300 MG/1
300 CAPSULE ORAL EVERY 12 HOURS
COMMUNITY
Start: 2023-09-19 | End: 2023-10-09

## 2023-10-05 NOTE — PROGRESS NOTES
FAMILY MEDICINE  [facility]    Reason for visit:   Chief Complaint   Patient presents with    [facility]    Annual Exam         SUBJECTIVE: Micaela Arreguin is a 28 y.o. female  - presents as a new patient to establish care and for routine annual physical.  Last PCP:  Dr. Raymond Tavera    Gynecology: Alisha Murphy MD  Psychiatry:  Dr. Alpa Mitchell    Micaela Arreguin reports overall that she is doing well.  She denies any concerns or complaints.  She has recently seen her gynecologist for pelvic pain and abnormal uterine bleeding.  She does have a history of ovarian cyst.  It appears that Gynecology performed pelvic ultrasound 05/2023 that showed a solid mass on the left ovary that appear to be a hemorrhagic cyst as well as a secondary left ovarian cyst.         Review of Systems   All other systems reviewed and are negative.      HEALTH MAINTENANCE:   Health Maintenance   Topic Date Due    Hepatitis C Screening  Never done    Pap Smear  10/18/2025    TETANUS VACCINE  02/04/2029    Lipid Panel  Completed     Health Maintenance Topics with due status: Not Due       Topic Last Completion Date    TETANUS VACCINE 02/04/2019    Pap Smear 10/18/2022     Health Maintenance Due   Topic Date Due    Hepatitis C Screening  Never done    HIV Screening  Never done    COVID-19 Vaccine (7 - 2023-24 season) 09/01/2023       HISTORY:   Past Medical History:   Diagnosis Date    ADHD (attention deficit hyperactivity disorder)     Depression        Past Surgical History:   Procedure Laterality Date    WISDOM TOOTH EXTRACTION         Family History   Problem Relation Age of Onset    Depression Mother     Mental illness Mother     ALS Father     Cancer Maternal Grandfather     Heart disease Maternal Grandfather     Cancer Maternal Grandmother     Heart disease Paternal Grandmother     Mental illness Sister     Mental illness Brother     Breast cancer Neg Hx     Colon cancer Neg Hx      Ovarian cancer Neg Hx     Celiac disease Neg Hx     Colon polyps Neg Hx     Esophageal cancer Neg Hx     Liver cancer Neg Hx     Liver disease Neg Hx     Rectal cancer Neg Hx     Stomach cancer Neg Hx        Social History     Tobacco Use    Smoking status: Never    Smokeless tobacco: Never   Substance Use Topics    Alcohol use: Yes     Alcohol/week: 2.0 standard drinks of alcohol     Types: 1 Glasses of wine, 1 Cans of beer per week     Comment: socially    Drug use: Never       Social History     Social History Narrative    Single. Lives alone with cat (2023 5 yo). From [place], [place]. 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  [employer] volley[employer]. Works in the her childhood  in [place]       ALLERGIES:   Review of patient's allergies indicates:   Allergen Reactions    Nut - unspecified Itching and Swelling       MEDS:   Current Outpatient Medications on File Prior to Visit   Medication Sig Dispense Refill Last Dose    desvenlafaxine succinate (PRISTIQ) 50 MG Tb24    Taking    semaglutide (OZEMPIC) 0.25 mg or 0.5 mg (2 mg/3 mL) pen injector Inject 0.5 mg into the skin every 7 days.   Taking    traZODone (DESYREL) 50 MG tablet Take by mouth.   Taking    VYVANSE 40 mg Cap Take 40 mg by mouth.   Taking    [DISCONTINUED] norethindrone-ethinyl estradiol (JUNEL FE 1/20) 1 mg-20 mcg (21)/75 mg (7) per tablet Take 1 tablet by mouth once daily. 84 tablet 3 Taking    traMADoL (ULTRAM) 50 mg tablet Take 1 tablet (50 mg total) by mouth every 8 (eight) hours as needed for Pain. (Patient not taking: Reported on 10/5/2023) 21 each 0 Not Taking    [DISCONTINUED] cefdinir (OMNICEF) 300 MG capsule Take 300 mg by mouth every 12 (twelve) hours.       [DISCONTINUED] dextroamphetamine-amphetamine (ADDERALL XR) 30 MG 24 hr capsule        [DISCONTINUED] EPINEPHrine (EPIPEN) 0.3 mg/0.3 mL AtIn Inject 0.3 mLs (0.3 mg total) into the muscle once. for 1 dose 0.6 mL 1      "[DISCONTINUED] norethindrone-e.estradioL-iron (LO LOESTRIN FE) 1 mg-10 mcg (24)/10 mcg (2) Tab        [DISCONTINUED] ondansetron (ZOFRAN) 8 MG tablet Take 1 tablet (8 mg total) by mouth every 8 (eight) hours as needed for Nausea. (Patient not taking: Reported on 10/5/2023) 10 tablet 0 Not Taking    [DISCONTINUED] tamsulosin (FLOMAX) 0.4 mg Cap Take 1 capsule (0.4 mg total) by mouth once daily. 30 capsule 0          Vital signs:   Vitals:    10/09/23 1457   BP: 118/70   Pulse: 108   SpO2: 99%   Weight: 96.6 kg (212 lb 15.4 oz)   Height: 5' 9" (1.753 m)     Body mass index is 31.45 kg/m².    PHYSICAL EXAM:     Physical Exam  Vitals reviewed.   Constitutional:       General: She is not in acute distress.  HENT:      Head: Normocephalic and atraumatic.      Right Ear: Tympanic membrane and ear canal normal.      Left Ear: Tympanic membrane and ear canal normal.   Eyes:      General: No scleral icterus.     Conjunctiva/sclera: Conjunctivae normal.   Neck:      Thyroid: No thyromegaly.      Vascular: No carotid bruit.   Cardiovascular:      Rate and Rhythm: Normal rate and regular rhythm.      Heart sounds: Normal heart sounds. No murmur heard.     No friction rub. No gallop.   Pulmonary:      Effort: Pulmonary effort is normal.      Breath sounds: Normal breath sounds. No wheezing or rales.   Abdominal:      General: Bowel sounds are normal. There is no distension.      Palpations: Abdomen is soft.      Tenderness: There is no abdominal tenderness.   Musculoskeletal:      Cervical back: Normal range of motion and neck supple.      Right lower leg: No edema.      Left lower leg: No edema.   Lymphadenopathy:      Cervical: No cervical adenopathy.   Skin:     General: Skin is warm.      Capillary Refill: Capillary refill takes less than 2 seconds.   Neurological:      Mental Status: She is alert.             PERTINENT RESULTS:   No visits with results within 1 Week(s) from this visit.   Latest known visit with results is: "   Office Visit on 05/25/2023   Component Date Value Ref Range Status    Chlamydia, Amplified DNA 05/25/2023 Not Detected  Not Detected Final    N gonorrhoeae, amplified DNA 05/25/2023 Not Detected  Not Detected Final    Trichomonas vaginalis 05/25/2023 Negative  Negative Final    Candida sp 05/25/2023 Negative  Negative Final    Comment: Britta species group includes: Candida albicans, Candida tropicalis,  Candida parapsiolosis, Britta dubliniensis      Britta glabrata DNA 05/25/2023 Negative  Negative Final    Britta krusei DNA 05/25/2023 Negative  Negative Final    Bacterial vaginosis DNA 05/25/2023 Negative  Negative Final     5/25/2023 Routine     Narrative & Impression  EXAMINATION:  US PELVIS COMP WITH TRANSVAG NON-OB (XPD)     CLINICAL HISTORY:  Abnormal uterine and vaginal bleeding, unspecified     FINDINGS:  The uterus measures 6.8 x 4.0 x 3.8 cm, the endometrium 14.9 mm.     Right ovary measures 3.7 x 2.0 x 3.2 cm, resistive index 0.5.     Left ovary measures 5.4 x 1.8 x 5.9 cm, resistive index 0.59.     There is a left para ovarian cyst measuring 3.4 cm.  This is simple.     There is a solid complex area of the left ovary measuring 1.9 cm.     Impression:     Left para ovarian cyst.     Solid mass lesion left ovary measuring 1.9 cm.  This is probably small hemorrhagic ovarian cyst.        Electronically signed by: Tonny Sandres MD  Date:                                            05/25/2023  Time:                                           14:22    ASSESSMENT/PLAN:    1. Encounter for general adult medical examination with abnormal findings  Overview:  - preventative health counseling  - counseling on current recommendations for breast cancer screening.  Average risk  - counseling on current recommendations for cervical cancer screening.  Up-to-date and followed by Gynecology  - counseling on current recommendations for colon cancer screening.  Average risk      Orders:  -     TSH; Future; Expected  date: 10/09/2023  -     Lipid Panel; Future; Expected date: 10/09/2023  -     Hemoglobin A1C; Future; Expected date: 10/09/2023  -     Comprehensive Metabolic Panel; Future; Expected date: 10/09/2023  -     CBC Auto Differential; Future; Expected date: 10/09/2023    2. BMI 31.0-31.9,adult  Overview:  - discussed recommendation for diet and cardiovascular exercise  - counseling on lifestyle modifications for risk factor reduction  - counseling on management options        3. Cyst of left ovary  Overview:  - 05/25/2023 pelvic ultrasound:Left para ovarian cyst.Solid mass lesion left ovary measuring 1.9 cm.  This is probably small hemorrhagic ovarian cyst  - followed by Gynecology        4. [place] allergy  Overview:  - history of tingling and swelling her mouth and right 2020   -no recurrent issues  -no prior allergy testing   -EpiPen refilled    Orders:  -     EPINEPHrine (EPIPEN) 0.3 mg/0.3 mL AtIn; Inject 0.3 mLs (0.3 mg total) into the muscle once. for 1 dose  Dispense: 1 each; Refill: 0    5. Screening for HIV (human immunodeficiency virus)  -     HIV 1/2 Ag/Ab (4th Gen); Future; Expected date: 10/09/2023    6. Need for hepatitis C screening test  -     Hepatitis C Antibody; Future; Expected date: 10/09/2023          ORDERS:   Orders Placed This Encounter    TSH    Lipid Panel    Hemoglobin A1C    Comprehensive Metabolic Panel    CBC Auto Differential    HIV 1/2 Ag/Ab (4th Gen)    Hepatitis C Antibody    EPINEPHrine (EPIPEN) 0.3 mg/0.3 mL AtIn       Vaccines recommended:  COVID 19 booster    Follow-up in 1 year pending results or sooner if any concerns.      This note is dictated using the M*Modal Fluency Direct word recognition program. There are word recognition mistakes that are occasionally missed on review.    Dr. Jennifer Ivory D.O.   [facility]

## 2023-10-05 NOTE — PATIENT INSTRUCTIONS
- Rest.    - Drink plenty of fluids.    - Acetaminophen (tylenol) or Ibuprofen (advil,motrin) as directed as needed for fever/pain. Avoid tylenol if you have a history of liver disease. Do not take ibuprofen if you have a history of GI bleeding, kidney disease, or if you take blood thinners.     - Ice for 15-20 minutes at a time for the next 24-48 hours.    - Elevate when possible.     - Follow up with your PCP or specialty clinic as directed in the next 1-2 weeks if not improved or as needed.  You can call [phone] to schedule an appointment with the appropriate provider.    - Go to the ER or seek medical attention immediately if you develop new or worsening symptoms.     - You must understand that you have received an Urgent Care treatment only and that you may be released before all of your medical problems are known or treated.   - You, the patient, will arrange for follow up care as instructed.   - If your condition worsens or fails to improve we recommend that you receive another evaluation at the ER immediately or contact your PCP to discuss your concerns or return here.

## 2023-10-05 NOTE — PROGRESS NOTES
"Subjective:      Patient ID: Micaela Arreguin is a 28 y.o. female.    Vitals:  height is 5' 9" (1.753 m) and weight is 107 kg (236 lb). Her oral temperature is 98.7 °F (37.1 °C). Her blood pressure is 126/85 and her pulse is 76. Her respiration is 16 and oxygen saturation is 97%.     Chief Complaint: Wrist Injury    Pt is coming in for a right wrist injury.  Patient states that yesterday she hit her wrist on a thumb.  There has been persistent pain, swelling, and bruising to the area.  No wounds.  Patient was instructed to come get it x-rayed and checked out.  Patient states that pain and tenderness is localized to right medial wrist, but will sometimes radiate up towards elbow with certain movements of the wrist and hand.      Wrist Injury   The incident occurred 12 to 24 hours ago. The incident occurred at school. The injury mechanism was a direct blow. The pain is present in the right wrist. The quality of the pain is described as shooting. Radiates to: right elbow. The pain is at a severity of 8/10. The pain is mild. The pain has been Constant since the incident. Pertinent negatives include no chest pain or numbness. The symptoms are aggravated by lifting, palpation and movement. She has tried ice and rest for the symptoms. The treatment provided no relief.       Constitution: Negative for chills, sweating, fatigue and fever.   HENT:  Negative for congestion and sore throat.    Neck: Negative for neck pain and neck stiffness.   Cardiovascular:  Negative for chest pain, leg swelling and palpitations.   Eyes:  Negative for eye itching, eye pain and eye redness.   Respiratory:  Negative for cough, sputum production and shortness of breath.    Gastrointestinal:  Negative for abdominal pain, nausea, vomiting and diarrhea.   Genitourinary:  Negative for dysuria, frequency and urgency.   Musculoskeletal:  Positive for pain and trauma. Negative for abnormal ROM of joint.   Skin:  Positive for bruising. " Negative for rash.   Neurological:  Negative for dizziness, headaches, disorientation, altered mental status, numbness and tingling.   Psychiatric/Behavioral:  Negative for altered mental status and disorientation.       Objective:     Physical Exam   Constitutional: She is oriented to person, place, and time. She appears well-developed.  Non-toxic appearance. She does not appear ill. No distress.   HENT:   Head: Normocephalic and atraumatic.   Ears:   Right Ear: External ear normal.   Left Ear: External ear normal.   Eyes: Conjunctivae are normal. Right eye exhibits no discharge. Left eye exhibits no discharge. No scleral icterus.   Pulmonary/Chest: Effort normal. No stridor. No respiratory distress. She has no wheezes.   Musculoskeletal:        Arms:    Neurological: She is alert and oriented to person, place, and time.   Skin: Skin is not diaphoretic.   Psychiatric: Her behavior is normal. Judgment and thought content normal.   Nursing note and vitals reviewed.    XR WRIST COMPLETE 3 VIEWS RIGHT    Result Date: 10/5/2023  EXAMINATION: XR WRIST COMPLETE 3 VIEWS RIGHT CLINICAL HISTORY: Pain in right wrist TECHNIQUE: PA, lateral, and oblique views of the right wrist were performed. COMPARISON: None FINDINGS: Three views right wrist. No acute displaced fracture or dislocation of the wrist.  No radiopaque foreign body.  No significant edema.     1. No acute displaced fracture or dislocation of the wrist. Electronically signed by: Dash Lamb MD Date:    10/05/2023 Time:    17:13       Assessment:     1. Right wrist pain    2. Contusion of right wrist, initial encounter        Plan:     I have interpreted x-rays negative for acute fracture.  Consistent with contusion.  Instructed rest, ice, elevate, NSAID.  Monitor closely, seek medical attention for new or worsening symptoms.  - Discussed ddx, home care, tx options, and given follow up precautions.  I have reviewed the patient's chart to view previous visits,  labs, and imaging to assess PMH and look for any trends or previous treatments.    Right wrist pain  -     XR WRIST COMPLETE 3 VIEWS RIGHT; Future; Expected date: 10/05/2023    Contusion of right wrist, initial encounter      Patient Instructions   - Rest.    - Drink plenty of fluids.    - Acetaminophen (tylenol) or Ibuprofen (advil,motrin) as directed as needed for fever/pain. Avoid tylenol if you have a history of liver disease. Do not take ibuprofen if you have a history of GI bleeding, kidney disease, or if you take blood thinners.     - Ice for 15-20 minutes at a time for the next 24-48 hours.    - Elevate when possible.     - Follow up with your PCP or specialty clinic as directed in the next 1-2 weeks if not improved or as needed.  You can call [phone] to schedule an appointment with the appropriate provider.    - Go to the ER or seek medical attention immediately if you develop new or worsening symptoms.     - You must understand that you have received an Urgent Care treatment only and that you may be released before all of your medical problems are known or treated.   - You, the patient, will arrange for follow up care as instructed.   - If your condition worsens or fails to improve we recommend that you receive another evaluation at the ER immediately or contact your PCP to discuss your concerns or return here.

## 2023-10-09 ENCOUNTER — OFFICE VISIT (OUTPATIENT)
Dept: PRIMARY CARE CLINIC | Facility: CLINIC | Age: 28
End: 2023-10-09
Attending: FAMILY MEDICINE
Payer: COMMERCIAL

## 2023-10-09 VITALS
OXYGEN SATURATION: 99 % | DIASTOLIC BLOOD PRESSURE: 70 MMHG | HEIGHT: 69 IN | HEART RATE: 108 BPM | SYSTOLIC BLOOD PRESSURE: 118 MMHG | BODY MASS INDEX: 31.54 KG/M2 | WEIGHT: 212.94 LBS

## 2023-10-09 DIAGNOSIS — Z11.4 SCREENING FOR HIV (HUMAN IMMUNODEFICIENCY VIRUS): ICD-10-CM

## 2023-10-09 DIAGNOSIS — N83.202 CYST OF LEFT OVARY: ICD-10-CM

## 2023-10-09 DIAGNOSIS — Z11.59 NEED FOR HEPATITIS C SCREENING TEST: ICD-10-CM

## 2023-10-09 DIAGNOSIS — Z91.018 WALNUT ALLERGY: ICD-10-CM

## 2023-10-09 DIAGNOSIS — Z00.01 ENCOUNTER FOR GENERAL ADULT MEDICAL EXAMINATION WITH ABNORMAL FINDINGS: Primary | ICD-10-CM

## 2023-10-09 PROCEDURE — 3008F PR BODY MASS INDEX (BMI) DOCUMENTED: ICD-10-PCS | Mod: CPTII,S$GLB,, | Performed by: FAMILY MEDICINE

## 2023-10-09 PROCEDURE — 99999 PR PBB SHADOW E&M-EST. PATIENT-LVL III: ICD-10-PCS | Mod: PBBFAC,,, | Performed by: FAMILY MEDICINE

## 2023-10-09 PROCEDURE — 99395 PR PREVENTIVE VISIT,EST,18-39: ICD-10-PCS | Mod: S$GLB,,, | Performed by: FAMILY MEDICINE

## 2023-10-09 PROCEDURE — 3074F PR MOST RECENT SYSTOLIC BLOOD PRESSURE < 130 MM HG: ICD-10-PCS | Mod: CPTII,S$GLB,, | Performed by: FAMILY MEDICINE

## 2023-10-09 PROCEDURE — 99395 PREV VISIT EST AGE 18-39: CPT | Mod: S$GLB,,, | Performed by: FAMILY MEDICINE

## 2023-10-09 PROCEDURE — 3008F BODY MASS INDEX DOCD: CPT | Mod: CPTII,S$GLB,, | Performed by: FAMILY MEDICINE

## 2023-10-09 PROCEDURE — 3078F PR MOST RECENT DIASTOLIC BLOOD PRESSURE < 80 MM HG: ICD-10-PCS | Mod: CPTII,S$GLB,, | Performed by: FAMILY MEDICINE

## 2023-10-09 PROCEDURE — 99999 PR PBB SHADOW E&M-EST. PATIENT-LVL III: CPT | Mod: PBBFAC,,, | Performed by: FAMILY MEDICINE

## 2023-10-09 PROCEDURE — 3078F DIAST BP <80 MM HG: CPT | Mod: CPTII,S$GLB,, | Performed by: FAMILY MEDICINE

## 2023-10-09 PROCEDURE — 3074F SYST BP LT 130 MM HG: CPT | Mod: CPTII,S$GLB,, | Performed by: FAMILY MEDICINE

## 2023-10-09 RX ORDER — EPINEPHRINE 0.3 MG/.3ML
1 INJECTION SUBCUTANEOUS ONCE
Qty: 1 EACH | Refills: 0 | Status: SHIPPED | OUTPATIENT
Start: 2023-10-09 | End: 2023-10-09

## 2023-10-13 ENCOUNTER — LAB VISIT (OUTPATIENT)
Dept: LAB | Facility: OTHER | Age: 28
End: 2023-10-13
Attending: FAMILY MEDICINE
Payer: COMMERCIAL

## 2023-10-13 DIAGNOSIS — Z00.01 ENCOUNTER FOR GENERAL ADULT MEDICAL EXAMINATION WITH ABNORMAL FINDINGS: ICD-10-CM

## 2023-10-13 DIAGNOSIS — Z11.4 SCREENING FOR HIV (HUMAN IMMUNODEFICIENCY VIRUS): ICD-10-CM

## 2023-10-13 DIAGNOSIS — Z11.59 NEED FOR HEPATITIS C SCREENING TEST: ICD-10-CM

## 2023-10-13 LAB
ALBUMIN SERPL BCP-MCNC: 3.5 G/DL (ref 3.5–5.2)
ALP SERPL-CCNC: 66 U/L (ref 55–135)
ALT SERPL W/O P-5'-P-CCNC: 16 U/L (ref 10–44)
ANION GAP SERPL CALC-SCNC: 8 MMOL/L (ref 8–16)
AST SERPL-CCNC: 16 U/L (ref 10–40)
BASOPHILS # BLD AUTO: 0.02 K/UL (ref 0–0.2)
BASOPHILS NFR BLD: 0.3 % (ref 0–1.9)
BILIRUB SERPL-MCNC: 0.2 MG/DL (ref 0.1–1)
BUN SERPL-MCNC: 9 MG/DL (ref 6–20)
CALCIUM SERPL-MCNC: 9.1 MG/DL (ref 8.7–10.5)
CHLORIDE SERPL-SCNC: 108 MMOL/L (ref 95–110)
CHOLEST SERPL-MCNC: 158 MG/DL (ref 120–199)
CHOLEST/HDLC SERPL: 3.9 {RATIO} (ref 2–5)
CO2 SERPL-SCNC: 24 MMOL/L (ref 23–29)
CREAT SERPL-MCNC: 0.8 MG/DL (ref 0.5–1.4)
DIFFERENTIAL METHOD: ABNORMAL
EOSINOPHIL # BLD AUTO: 0.1 K/UL (ref 0–0.5)
EOSINOPHIL NFR BLD: 1 % (ref 0–8)
ERYTHROCYTE [DISTWIDTH] IN BLOOD BY AUTOMATED COUNT: 12 % (ref 11.5–14.5)
EST. GFR  (NO RACE VARIABLE): >60 ML/MIN/1.73 M^2
ESTIMATED AVG GLUCOSE: 94 MG/DL (ref 68–131)
GLUCOSE SERPL-MCNC: 90 MG/DL (ref 70–110)
HBA1C MFR BLD: 4.9 % (ref 4–5.6)
HCT VFR BLD AUTO: 36.3 % (ref 37–48.5)
HCV AB SERPL QL IA: NORMAL
HDLC SERPL-MCNC: 41 MG/DL (ref 40–75)
HDLC SERPL: 25.9 % (ref 20–50)
HGB BLD-MCNC: 12 G/DL (ref 12–16)
HIV 1+2 AB+HIV1 P24 AG SERPL QL IA: NORMAL
IMM GRANULOCYTES # BLD AUTO: 0.01 K/UL (ref 0–0.04)
IMM GRANULOCYTES NFR BLD AUTO: 0.2 % (ref 0–0.5)
LDLC SERPL CALC-MCNC: 103.8 MG/DL (ref 63–159)
LYMPHOCYTES # BLD AUTO: 1.8 K/UL (ref 1–4.8)
LYMPHOCYTES NFR BLD: 30.3 % (ref 18–48)
MCH RBC QN AUTO: 29.6 PG (ref 27–31)
MCHC RBC AUTO-ENTMCNC: 33.1 G/DL (ref 32–36)
MCV RBC AUTO: 89 FL (ref 82–98)
MONOCYTES # BLD AUTO: 0.6 K/UL (ref 0.3–1)
MONOCYTES NFR BLD: 9.7 % (ref 4–15)
NEUTROPHILS # BLD AUTO: 3.4 K/UL (ref 1.8–7.7)
NEUTROPHILS NFR BLD: 58.5 % (ref 38–73)
NONHDLC SERPL-MCNC: 117 MG/DL
NRBC BLD-RTO: 0 /100 WBC
PLATELET # BLD AUTO: 353 K/UL (ref 150–450)
PMV BLD AUTO: 9.9 FL (ref 9.2–12.9)
POTASSIUM SERPL-SCNC: 3.8 MMOL/L (ref 3.5–5.1)
PROT SERPL-MCNC: 6.7 G/DL (ref 6–8.4)
RBC # BLD AUTO: 4.06 M/UL (ref 4–5.4)
SODIUM SERPL-SCNC: 140 MMOL/L (ref 136–145)
TRIGL SERPL-MCNC: 66 MG/DL (ref 30–150)
TSH SERPL DL<=0.005 MIU/L-ACNC: 0.82 UIU/ML (ref 0.4–4)
WBC # BLD AUTO: 5.85 K/UL (ref 3.9–12.7)

## 2023-10-13 PROCEDURE — 87389 HIV-1 AG W/HIV-1&-2 AB AG IA: CPT | Performed by: FAMILY MEDICINE

## 2023-10-13 PROCEDURE — 84443 ASSAY THYROID STIM HORMONE: CPT | Performed by: FAMILY MEDICINE

## 2023-10-13 PROCEDURE — 86803 HEPATITIS C AB TEST: CPT | Performed by: FAMILY MEDICINE

## 2023-10-13 PROCEDURE — 80053 COMPREHEN METABOLIC PANEL: CPT | Performed by: FAMILY MEDICINE

## 2023-10-13 PROCEDURE — 36415 COLL VENOUS BLD VENIPUNCTURE: CPT | Performed by: FAMILY MEDICINE

## 2023-10-13 PROCEDURE — 80061 LIPID PANEL: CPT | Performed by: FAMILY MEDICINE

## 2023-10-13 PROCEDURE — 83036 HEMOGLOBIN GLYCOSYLATED A1C: CPT | Performed by: FAMILY MEDICINE

## 2023-10-13 PROCEDURE — 85025 COMPLETE CBC W/AUTO DIFF WBC: CPT | Performed by: FAMILY MEDICINE

## 2023-11-30 ENCOUNTER — OFFICE VISIT (OUTPATIENT)
Dept: OBSTETRICS AND GYNECOLOGY | Facility: CLINIC | Age: 28
End: 2023-11-30
Attending: STUDENT IN AN ORGANIZED HEALTH CARE EDUCATION/TRAINING PROGRAM
Payer: COMMERCIAL

## 2023-11-30 VITALS
BODY MASS INDEX: 31.84 KG/M2 | WEIGHT: 215.63 LBS | DIASTOLIC BLOOD PRESSURE: 94 MMHG | SYSTOLIC BLOOD PRESSURE: 124 MMHG

## 2023-11-30 DIAGNOSIS — N93.9 ABNORMAL UTERINE BLEEDING: ICD-10-CM

## 2023-11-30 DIAGNOSIS — Z87.42 HISTORY OF OVARIAN CYST: Primary | ICD-10-CM

## 2023-11-30 PROCEDURE — 1159F PR MEDICATION LIST DOCUMENTED IN MEDICAL RECORD: ICD-10-PCS | Mod: CPTII,S$GLB,, | Performed by: STUDENT IN AN ORGANIZED HEALTH CARE EDUCATION/TRAINING PROGRAM

## 2023-11-30 PROCEDURE — 1160F PR REVIEW ALL MEDS BY PRESCRIBER/CLIN PHARMACIST DOCUMENTED: ICD-10-PCS | Mod: CPTII,S$GLB,, | Performed by: STUDENT IN AN ORGANIZED HEALTH CARE EDUCATION/TRAINING PROGRAM

## 2023-11-30 PROCEDURE — 1160F RVW MEDS BY RX/DR IN RCRD: CPT | Mod: CPTII,S$GLB,, | Performed by: STUDENT IN AN ORGANIZED HEALTH CARE EDUCATION/TRAINING PROGRAM

## 2023-11-30 PROCEDURE — 99212 PR OFFICE/OUTPT VISIT, EST, LEVL II, 10-19 MIN: ICD-10-PCS | Mod: S$GLB,,, | Performed by: STUDENT IN AN ORGANIZED HEALTH CARE EDUCATION/TRAINING PROGRAM

## 2023-11-30 PROCEDURE — 3044F HG A1C LEVEL LT 7.0%: CPT | Mod: CPTII,S$GLB,, | Performed by: STUDENT IN AN ORGANIZED HEALTH CARE EDUCATION/TRAINING PROGRAM

## 2023-11-30 PROCEDURE — 3008F PR BODY MASS INDEX (BMI) DOCUMENTED: ICD-10-PCS | Mod: CPTII,S$GLB,, | Performed by: STUDENT IN AN ORGANIZED HEALTH CARE EDUCATION/TRAINING PROGRAM

## 2023-11-30 PROCEDURE — 99212 OFFICE O/P EST SF 10 MIN: CPT | Mod: S$GLB,,, | Performed by: STUDENT IN AN ORGANIZED HEALTH CARE EDUCATION/TRAINING PROGRAM

## 2023-11-30 PROCEDURE — 3074F SYST BP LT 130 MM HG: CPT | Mod: CPTII,S$GLB,, | Performed by: STUDENT IN AN ORGANIZED HEALTH CARE EDUCATION/TRAINING PROGRAM

## 2023-11-30 PROCEDURE — 3080F DIAST BP >= 90 MM HG: CPT | Mod: CPTII,S$GLB,, | Performed by: STUDENT IN AN ORGANIZED HEALTH CARE EDUCATION/TRAINING PROGRAM

## 2023-11-30 PROCEDURE — 3008F BODY MASS INDEX DOCD: CPT | Mod: CPTII,S$GLB,, | Performed by: STUDENT IN AN ORGANIZED HEALTH CARE EDUCATION/TRAINING PROGRAM

## 2023-11-30 PROCEDURE — 3080F PR MOST RECENT DIASTOLIC BLOOD PRESSURE >= 90 MM HG: ICD-10-PCS | Mod: CPTII,S$GLB,, | Performed by: STUDENT IN AN ORGANIZED HEALTH CARE EDUCATION/TRAINING PROGRAM

## 2023-11-30 PROCEDURE — 3044F PR MOST RECENT HEMOGLOBIN A1C LEVEL <7.0%: ICD-10-PCS | Mod: CPTII,S$GLB,, | Performed by: STUDENT IN AN ORGANIZED HEALTH CARE EDUCATION/TRAINING PROGRAM

## 2023-11-30 PROCEDURE — 1159F MED LIST DOCD IN RCRD: CPT | Mod: CPTII,S$GLB,, | Performed by: STUDENT IN AN ORGANIZED HEALTH CARE EDUCATION/TRAINING PROGRAM

## 2023-11-30 PROCEDURE — 99999 PR PBB SHADOW E&M-EST. PATIENT-LVL III: ICD-10-PCS | Mod: PBBFAC,,, | Performed by: STUDENT IN AN ORGANIZED HEALTH CARE EDUCATION/TRAINING PROGRAM

## 2023-11-30 PROCEDURE — 3074F PR MOST RECENT SYSTOLIC BLOOD PRESSURE < 130 MM HG: ICD-10-PCS | Mod: CPTII,S$GLB,, | Performed by: STUDENT IN AN ORGANIZED HEALTH CARE EDUCATION/TRAINING PROGRAM

## 2023-11-30 PROCEDURE — 99999 PR PBB SHADOW E&M-EST. PATIENT-LVL III: CPT | Mod: PBBFAC,,, | Performed by: STUDENT IN AN ORGANIZED HEALTH CARE EDUCATION/TRAINING PROGRAM

## 2023-11-30 RX ORDER — NORETHINDRONE ACETATE AND ETHINYL ESTRADIOL 1MG-20(21)
1 KIT ORAL
COMMUNITY
Start: 2023-10-14

## 2023-11-30 NOTE — PROGRESS NOTES
Chief Complaint: Follow-up pelvic pain and AUB     HPI:      Micaela Arreguin is a 28 y.o.  who presents today for follow up of pelvic pain and AUB. Started on OCPs in May. Has been doing well with them. Very light cycles. Pelvic pain had been gone until this past week. Now having some left lower pelvic pain daily, comes and goes, worse with moving. Reports normal Bms. Denies any urinary symptoms. No other complaints today.    Menses are regular. Patient's last menstrual period was 2023.       Physical Exam:      PHYSICAL EXAM:  BP (!) 124/94 (BP Location: Right arm, Patient Position: Sitting, BP Method: Medium (Manual))   Wt 97.8 kg (215 lb 9.8 oz)   LMP 2023   BMI 31.84 kg/m²   Body mass index is 31.84 kg/m².     APPEARANCE: Well nourished, well developed, in no acute distress.  ABD: soft, non-tender, non-distended     Assessment/Plan:     History of ovarian cyst    Abnormal uterine bleeding        - discussed may have functional cyst vs persistence of that same left adnexal simple cyst - if pain remains present in 4 weeks, will plan to repeat pelvic US, she will keep me informed  - NSAIDs and heating pad PRN in meantime   - discussed ozempic can decrease the absorption of OCP - she reports not currently sexually active but will let me know if bleeding pattern becomes abnormal or becomes sexually active to discuss other options  - rtc when due for annual exam or sooner if needed     Alisha Diallo MD  Obstetrics and Gynecology  [facility]

## 2024-01-08 PROBLEM — Z00.01 ENCOUNTER FOR GENERAL ADULT MEDICAL EXAMINATION WITH ABNORMAL FINDINGS: Status: RESOLVED | Noted: 2023-10-09 | Resolved: 2024-01-08

## 2024-01-12 ENCOUNTER — OFFICE VISIT (OUTPATIENT)
Dept: URGENT CARE | Facility: CLINIC | Age: 29
End: 2024-01-12
Payer: OTHER MISCELLANEOUS

## 2024-01-12 VITALS
DIASTOLIC BLOOD PRESSURE: 97 MMHG | HEIGHT: 69 IN | SYSTOLIC BLOOD PRESSURE: 138 MMHG | RESPIRATION RATE: 19 BRPM | WEIGHT: 219.56 LBS | TEMPERATURE: 98 F | HEART RATE: 90 BPM | BODY MASS INDEX: 32.52 KG/M2 | OXYGEN SATURATION: 98 %

## 2024-01-12 DIAGNOSIS — S80.02XA CONTUSION OF LEFT KNEE AND LOWER LEG, INITIAL ENCOUNTER: ICD-10-CM

## 2024-01-12 DIAGNOSIS — S80.12XA CONTUSION OF LEFT KNEE AND LOWER LEG, INITIAL ENCOUNTER: ICD-10-CM
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
## 2024-01-12 DIAGNOSIS — Z02.6 ENCOUNTER RELATED TO WORKER'S COMPENSATION CLAIM: ICD-10-CM

## 2024-01-12 DIAGNOSIS — S80.11XA CONTUSION OF RIGHT LOWER LEG, INITIAL ENCOUNTER: Primary | ICD-10-CM

## 2024-01-12 LAB
CTP QC/QA: YES
POC 10 PANEL DRUG SCREEN: ABNORMAL

## 2024-01-12 PROCEDURE — 80305 DRUG TEST PRSMV DIR OPT OBS: CPT | Mod: QW,59,S$GLB, | Performed by: PHYSICIAN ASSISTANT

## 2024-01-12 PROCEDURE — 80305 DRUG TEST PRSMV DIR OPT OBS: CPT | Mod: QW,S$GLB,, | Performed by: PHYSICIAN ASSISTANT

## 2024-01-12 PROCEDURE — 99203 OFFICE O/P NEW LOW 30 MIN: CPT | Mod: S$GLB,,, | Performed by: PHYSICIAN ASSISTANT

## 2024-01-12 NOTE — PROGRESS NOTES
Subjective:      Patient ID: Micaela Arreguni is a 28 y.o. female.    Chief Complaint: Leg Injury (L/R LEG, L ANKLE )    Patient's place of employment - [employer]   Patient's job title - TEACHER   Date of injury - 1/11/2024  Body part injured including left or right - L/R LEG, L ANKLE   Injury Mechanism - TRIPPED OVER BROKEN BRICK WHILE WALKING DOWN STEPS  What they were doing when they got hurt - WORKING   What they did immediately after - VANDANA RIVERA [facility]  Pain scale right now - 6/10    KSD    28-year-old female  presents with bilateral leg pain and swelling after tripping over a broken brick while walking down stairs.  Patient states she fell onto the left knee and also struck the anterior aspects of her legs.  Patient was able to ambulate immediately following the fall.  She reports mild pain in the posterior left ankle as well. MEB      Constitution: Positive for activity change.   HENT:  Negative for facial trauma.    Neck: Negative for neck pain.   Cardiovascular:  Negative for chest trauma.   Respiratory:  Negative for shortness of breath.    Gastrointestinal:  Negative for abdominal trauma.   Musculoskeletal:  Positive for pain and trauma. Negative for abnormal ROM of joint.   Skin:  Positive for bruising.   Neurological:  Negative for numbness and tingling.     Objective:     Physical Exam  Vitals and nursing note reviewed.   Constitutional:       General: She is not in acute distress.     Appearance: She is well-developed. She is not diaphoretic.   HENT:      Head: Normocephalic and atraumatic.      Right Ear: Hearing and external ear normal.      Left Ear: Hearing and external ear normal.      Nose: Nose normal. No nasal deformity.   Eyes:      General: Lids are normal. No scleral icterus.     Conjunctiva/sclera: Conjunctivae normal.   Neck:      Trachea: Trachea normal.   Cardiovascular:      Pulses: Normal pulses.   Pulmonary:      Effort: Pulmonary effort is  normal. No respiratory distress.      Breath sounds: No stridor.   Musculoskeletal:      Cervical back: Normal range of motion.      Left knee: No swelling. Normal range of motion. No tenderness.      Right lower leg: Swelling and tenderness present. No deformity or lacerations.      Left lower leg: Swelling and tenderness present. No deformity or lacerations.      Right ankle: Normal. No swelling, deformity or ecchymosis. No tenderness. Normal range of motion. Normal pulse.      Right Achilles Tendon: Normal. No tenderness or defects. Nguyen's test negative.      Left ankle: Normal. No swelling, deformity or ecchymosis. No tenderness. Normal range of motion. Normal pulse.      Left Achilles Tendon: Normal. No tenderness or defects. Nguyen's test negative.      Right foot: Normal. Normal range of motion. No swelling, deformity or tenderness. Normal pulse.      Left foot: Normal. Normal range of motion. No swelling, deformity or tenderness. Normal pulse.        Legs:       Comments: Anterior legs show focal area of swelling, ecchymosis and tenderness mid 1/3rd.  Left knee shows a small abrasion.  Left knee is nontender, no ligament laxity, effusion or swelling.   Skin:     General: Skin is warm and dry.      Capillary Refill: Capillary refill takes less than 2 seconds.   Neurological:      Mental Status: She is alert. She is not disoriented.      GCS: GCS eye subscore is 4. GCS verbal subscore is 5. GCS motor subscore is 6.      Sensory: No sensory deficit.   Psychiatric:         Attention and Perception: She is attentive.         Speech: Speech normal.         Behavior: Behavior normal.        Assessment:      1. Contusion of right lower leg, initial encounter    2. Encounter related to worker's compensation claim    3. Contusion of left knee and lower leg, initial encounter    4. Work related injury      Plan:     Patient ambulates without limitation.  There is no obvious joint deformities.  I do not believe  x-rays are indicated at this time.  Patient most likely suffered soft tissue contusions from her fall.  Advised to apply ice, take over-the-counter acetaminophen or ibuprofen as needed.  She may follow-up as needed.  Patient verbalized understanding and agreement.     Patient Instructions:  (Apply ice 2-3 times daily for 30 minutes.  You may take over-the-counter acetaminophen and ibuprofen as needed for pain.)   Restrictions: Regular Duty, Discharged from Occupational Health  Follow up if symptoms worsen or fail to improve.

## 2024-01-12 NOTE — LETTER
Urgent Care - [address] [address]  Phone: [phone]  Fax: [phone]  [facility]: [id number]    Pt Name: Micaela Arreguin  Injury Date: 01/12/2024   Employee ID: [id number] Date of First Treatment: 01/12/2024   Company: [employer]        Appointment Time: 01:05 PM Arrived: 1:27 pm   Provider: Bhanu Javed PA-C Time Out: 2:14 pm     Office Treatment:   1. Contusion of right lower leg, initial encounter    2. Encounter related to worker's compensation claim    3. Contusion of left knee and lower leg, initial encounter    4. Work related injury          Patient Instructions:  (Apply ice 2-3 times daily for 30 minutes.  You may take over-the-counter acetaminophen and ibuprofen as needed for pain.)      Restrictions: Regular Duty, Discharged from Occupational Health     Return Appointment: Return if symptoms fail to improve or worsen.     david

## 2024-01-25 ENCOUNTER — OFFICE VISIT (OUTPATIENT)
Dept: URGENT CARE | Facility: CLINIC | Age: 29
End: 2024-01-25
Payer: OTHER MISCELLANEOUS

## 2024-01-25 VITALS
HEART RATE: 94 BPM | WEIGHT: 219 LBS | BODY MASS INDEX: 32.44 KG/M2 | OXYGEN SATURATION: 98 % | HEIGHT: 69 IN | SYSTOLIC BLOOD PRESSURE: 123 MMHG | DIASTOLIC BLOOD PRESSURE: 84 MMHG | RESPIRATION RATE: 14 BRPM

## 2024-01-25 DIAGNOSIS — S80.12XD CONTUSION OF LEFT KNEE AND LOWER LEG, SUBSEQUENT ENCOUNTER: Primary | ICD-10-CM

## 2024-01-25 DIAGNOSIS — S80.02XD CONTUSION OF LEFT KNEE AND LOWER LEG, SUBSEQUENT ENCOUNTER: Primary | ICD-10-CM

## 2024-01-25 DIAGNOSIS — S89.92XD INJURY OF LEFT LOWER EXTREMITY, SUBSEQUENT ENCOUNTER: ICD-10-CM
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
## 2024-01-25 DIAGNOSIS — Z02.6 ENCOUNTER RELATED TO WORKER'S COMPENSATION CLAIM: ICD-10-CM

## 2024-01-25 PROCEDURE — 73590 X-RAY EXAM OF LOWER LEG: CPT | Mod: LT,S$GLB,, | Performed by: RADIOLOGY

## 2024-01-25 PROCEDURE — 99213 OFFICE O/P EST LOW 20 MIN: CPT | Mod: S$GLB,,, | Performed by: PHYSICIAN ASSISTANT

## 2024-01-25 NOTE — PROGRESS NOTES
Subjective:      Patient ID: Micaela Arreguin is a 28 y.o. female.    Chief Complaint: Leg Injury (DOI:01/11/2024--Rt and Lt leg/SW)    Patient's place of employment - [employer]  Patient's job title - Teacher  Date of Injury - 01/11/2024  Body part injured - Let and Rt leg   Current work status per last visit - Regular Duty  Improved, same, or worse - Pain is the same, bruising has improved  Pain Scale right now (1-10) -  7  SW    Patient presents for follow-up bilateral leg contusions.  She reports the right leg has improved greatly however still has persistent pain about the anterior aspect of the left leg.  She denies numbness and tingling of the left foot.  Says she has been applying ice and elevating the leg. MEB    Leg Pain   Pertinent negatives include no numbness.       Constitution: Positive for activity change.   HENT:  Negative for facial trauma.    Neck: Negative for neck pain.   Cardiovascular:  Negative for chest trauma.   Respiratory:  Negative for shortness of breath.    Gastrointestinal:  Negative for abdominal trauma.   Musculoskeletal:  Positive for pain and trauma. Negative for joint pain, joint swelling and abnormal ROM of joint.   Skin:  Positive for bruising.   Neurological:  Negative for numbness and tingling.     Objective:     Physical Exam  Vitals and nursing note reviewed.   Constitutional:       General: She is not in acute distress.     Appearance: She is well-developed. She is not diaphoretic.   HENT:      Head: Normocephalic and atraumatic.      Right Ear: Hearing and external ear normal.      Left Ear: Hearing and external ear normal.      Nose: Nose normal. No nasal deformity.   Eyes:      General: Lids are normal. No scleral icterus.     Conjunctiva/sclera: Conjunctivae normal.   Neck:      Trachea: Trachea normal.   Cardiovascular:      Pulses: Normal pulses.   Pulmonary:      Effort: Pulmonary effort is normal. No respiratory distress.      Breath  sounds: No stridor.   Musculoskeletal:      Cervical back: Normal range of motion.      Left knee: No swelling. Normal range of motion. No tenderness.      Left lower leg: Swelling and tenderness present. No deformity or lacerations.        Legs:       Comments: Anterior left leg shows focal area of swelling, ecchymosis and tenderness mid 1/3rd.     Skin:     General: Skin is warm and dry.      Capillary Refill: Capillary refill takes less than 2 seconds.   Neurological:      Mental Status: She is alert. She is not disoriented.      GCS: GCS eye subscore is 4. GCS verbal subscore is 5. GCS motor subscore is 6.      Sensory: No sensory deficit.   Psychiatric:         Attention and Perception: She is attentive.         Speech: Speech normal.         Behavior: Behavior normal.          X-Ray Tibia Fibula 2 View Left    Result Date: 1/25/2024  EXAMINATION: XR TIBIA FIBULA 2 VIEW LEFT CLINICAL HISTORY: Unspecified injury of left lower leg, subsequent encounter TECHNIQUE: AP and lateral views of the left tibia and fibula were performed. COMPARISON: None. FINDINGS: Bones are well mineralized. Overall alignment is within normal limits. No displaced fracture, dislocation or destructive osseous process.  Joint spaces appear relatively maintained.  No subcutaneous emphysema or radiodense retained foreign body.     No acute displaced fracture-dislocation identified. Electronically signed by: Ze Vidal MD Date:    01/25/2024 Time:    14:13     Assessment:      1. Contusion of left knee and lower leg, subsequent encounter    2. Encounter related to worker's compensation claim    3. Injury of left lower extremity, subsequent encounter    4. Work related injury      Plan:       RICE, OTC ibuprofen and APAP prn.          Restrictions: Regular Duty  Follow up in about 2 weeks (around 2/8/2024) for Reassessment.

## 2024-01-25 NOTE — LETTER
Urgent Care [address] [address]  Phone: [phone]  Fax: [phone]  [facility]: [id number]    Pt Name: Micaela Arreguin  Injury Date: 01/11/2024   Employee ID: [id number] Date of Treatment: 01/25/2024   Company: [employer] [facility]      Appointment Time: 12:30 PM Arrived: 12:55 pm   Provider: Bhanu Javed PA-C Time Out:2:30 pm     Office Treatment:   1. Contusion of left knee and lower leg, subsequent encounter    2. Encounter related to worker's compensation claim    3. Injury of left lower extremity, subsequent encounter    4. Work related injury               Restrictions: Regular Duty     Return Appointment: 2/8/2024 at 8:30 am  SW

## 2024-04-17 ENCOUNTER — E-VISIT (OUTPATIENT)
Dept: PRIMARY CARE CLINIC | Facility: CLINIC | Age: 29
End: 2024-04-17
Attending: FAMILY MEDICINE
Payer: COMMERCIAL

## 2024-04-17 DIAGNOSIS — L98.491 SKIN ULCER OF UPPER ARM, LIMITED TO BREAKDOWN OF SKIN: Primary | ICD-10-CM

## 2024-04-17 PROCEDURE — 99422 OL DIG E/M SVC 11-20 MIN: CPT | Mod: ,,, | Performed by: FAMILY MEDICINE

## 2024-04-18 RX ORDER — MUPIROCIN 20 MG/G
OINTMENT TOPICAL 3 TIMES DAILY
Qty: 30 G | Refills: 0 | Status: SHIPPED | OUTPATIENT
Start: 2024-04-18

## 2024-04-18 NOTE — PROGRESS NOTES
Patient ID: Micaela Arreguin is a 29 y.o. female.    Chief Complaint: Rash (Entered automatically based on patient selection in Patient Portal.)      The patient initiated a request through [employer] on 4/17/2024 for evaluation and management with a chief complaint of Rash (Entered automatically based on patient selection in Patient Portal.)     I evaluated the questionnaire submission on 4/18/24. I reviewed Micaela Arreguin 's medical history, allergies and current medications.     Patient Active Problem List   Diagnosis    Cyst of left ovary    Abnormal uterine bleeding    Attention deficit hyperactivity disorder (ADHD), combined type    Depression    BMI 31.0-31.9,adult    Brighton allergy       Review of patient's allergies indicates:   Allergen Reactions    Nut - unspecified Itching and Swelling       [facility]s Peq Evisit Rash    4/17/2024  7:40 PM CDT - Filed by Patient   Do you agree to participate in an E-Visit? Yes   If you have any of the following symptoms, please present to your local ER or call 911:  I acknowledge   What is the main issue you would like addressed today? Potentially infected bug bite   Are you able to take your vital signs? No   Are you pregnant, could you be pregnant, or are you breast feeding? None of the above   How would you describe your skin problem? Lump or bump   When did your symptoms first appear? 4/15/2024   Where is it located?  Leg(s);  Foot/Feet   Does it itch? Yes   Does it hurt? No   Is there discharge or drainage? Yes   Describe the discharge or drainage. Clear colored   Is there bleeding? No   Describe the character Raised;  Solid or firm;  Open;  Clear fluid filled;  Draining   Describe the color Red   Has it changed over time? No change   Frequency of skin problem Daily   Duration of the skin problem (how long does it stay when it is present) Never goes away   I have had a new exposure to No new exposures   I have had a new exposure to No new  exposures   What have you used to treat the skin problem? Calamine lotion, hydrocortisone cream, benadryl cream   If you have used anything for treatment, has it helped the symptoms? Yes   Other generalized symptoms that you associate with the rash No other symptoms   Provide any additional information you feel is important. The top layer of the bug bite came off tonight (4/17) while I was showering and now its really open. Because it is draining the clear liquid, Ive had to change the bandaid upwards of 5 times daily.   At least one photo is required for treatment to be provided. You can upload a maximum of three photos of the affected area.           1. Skin ulcer of upper arm, limited to breakdown of skin  -     mupirocin (BACTROBAN) 2 % ointment; Apply topically 3 (three) times daily.  Dispense: 30 g; Refill: 0      - See [facility] Message    Orders Placed This Encounter    mupirocin (BACTROBAN) 2 % ointment       Follow up in about 2 weeks (around 5/1/2024).    E-Visit Time Tracking:    Day 1 Time (in minutes): 11    Total Time (in minutes): 11

## 2024-05-29 NOTE — TELEPHONE ENCOUNTER
Refill Routing Note   Medication(s) are not appropriate for processing by [facility] for the following reason(s):        No active prescription written by provider    [facility] action(s):  Defer               Appointments  past 12m or future 3m with PCP    Date Provider   Last Visit   11/30/2023 Alisha Diallo MD   Next Visit   8/1/2024 Alisha Diallo MD   ED visits in past 90 days: 0        Note composed:4:44 PM 05/29/2024

## 2024-05-30 RX ORDER — NORETHINDRONE ACETATE AND ETHINYL ESTRADIOL 1MG-20(21)
1 KIT ORAL DAILY
Qty: 90 TABLET | Refills: 1 | Status: SHIPPED | OUTPATIENT
Start: 2024-05-30

## 2024-10-04 ENCOUNTER — OFFICE VISIT (OUTPATIENT)
Dept: OBSTETRICS AND GYNECOLOGY | Facility: CLINIC | Age: 29
End: 2024-10-04
Attending: STUDENT IN AN ORGANIZED HEALTH CARE EDUCATION/TRAINING PROGRAM
Payer: COMMERCIAL

## 2024-10-04 VITALS
SYSTOLIC BLOOD PRESSURE: 116 MMHG | HEIGHT: 69 IN | WEIGHT: 226.75 LBS | BODY MASS INDEX: 33.58 KG/M2 | DIASTOLIC BLOOD PRESSURE: 80 MMHG

## 2024-10-04 DIAGNOSIS — Z01.419 ENCOUNTER FOR GYNECOLOGICAL EXAMINATION: Primary | ICD-10-CM

## 2024-10-04 PROCEDURE — 99999 PR PBB SHADOW E&M-EST. PATIENT-LVL III: CPT | Mod: PBBFAC,,, | Performed by: STUDENT IN AN ORGANIZED HEALTH CARE EDUCATION/TRAINING PROGRAM

## 2024-10-04 NOTE — PROGRESS NOTES
Chief Complaint: Well Woman Exam     HPI:      Micaela Arreguin is a 29 y.o.  who presents today for well woman exam.    Regular cycle with OCP. LMP: Patient's last menstrual period was 09/15/2024 (exact date).    Today patient GYN complaints include: none.  Specifically, patient denies abnormal vaginal bleeding, discharge, pelvic pain, urinary problems.    Ms. Arreguin is not currently sexually active.   Previous Pap: NILM, HPV negative (10/21/2022)       Past Medical History:   Diagnosis Date    ADHD (attention deficit hyperactivity disorder)     Depression        Past Surgical History:   Procedure Laterality Date    WISDOM TOOTH EXTRACTION         Social History     Socioeconomic History    Marital status: Single   Tobacco Use    Smoking status: Never     Passive exposure: Never    Smokeless tobacco: Never   Substance and Sexual Activity    Alcohol use: Yes     Alcohol/week: 2.0 standard drinks of alcohol     Types: 1 Glasses of wine, 1 Cans of beer per week     Comment: socially    Drug use: Never    Sexual activity: Not Currently     Partners: Male     Birth control/protection: Condom, Other-see comments     Comment: Pills   Social History Narrative    Single. Lives alone with cat ( 5 yo). From [place], [place]. 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  [employer] volley[employer]. Works in the her childhood  in [place]       Family History   Problem Relation Name Age of Onset    Depression Mother Maxine Arreguin     Mental illness Mother Maxine Arreguin     ALS Father      Cancer Maternal Grandfather Morgan HoskinsVinh     Heart disease Maternal Grandfather Morgan Chuatrick     Cancer Maternal Grandmother Maxine Chuatrick     Heart disease Paternal Grandmother Lyubov Arreguin     Mental illness Sister Shaniqua Arreguin     Mental illness Brother Evin Arreguin     Breast cancer Neg Hx      Colon cancer Neg Hx       "Ovarian cancer Neg Hx      Celiac disease Neg Hx      Colon polyps Neg Hx      Esophageal cancer Neg Hx      Liver cancer Neg Hx      Liver disease Neg Hx      Rectal cancer Neg Hx      Stomach cancer Neg Hx         Review of patient's allergies indicates:   Allergen Reactions    Nut - unspecified Itching and Swelling       OB History          0    Para   0    Term   0       0    AB   0    Living   0         SAB   0    IAB   0    Ectopic   0    Multiple   0    Live Births   0                 Physical Exam:      PHYSICAL EXAM:  /80 (BP Location: Left arm, Patient Position: Sitting)   Ht 5' 9" (1.753 m)   Wt 102.9 kg (226 lb 11.9 oz)   LMP 09/15/2024 (Exact Date)   BMI 33.48 kg/m²   Body mass index is 33.48 kg/m².     APPEARANCE: Well nourished, well developed, in no acute distress.  PSYCH: Appropriate mood and affect.  SKIN: No acne or hirsutism  NECK: Neck symmetric without masses  NODES: No inguinal, axillary, or supraclavicular lymph node enlargement  ABDOMEN: Soft.  No tenderness or masses.    CARDIOVASCULAR: No edema of peripheral extremities  BREASTS: Symmetrical, no visible skin lesions. No palpable masses. No nipple discharge bilaterally.  PELVIC: Normal external genitalia without lesions.  Normal hair distribution.  Adequate perineal body, normal urethral meatus.  Vagina moist and well rugated. Without lesions. Without discharge.  Cervix pink, without lesions, discharge or tenderness.  No significant cystocele or rectocele.  Bimanual exam shows uterus to be normal size, regular, mobile and nontender.  Adnexa without masses or tenderness.      Assessment/Plan:     Encounter for gynecological examination      - pelvic exam normal  - pap/hpv due 10/2025  - no contraindication to OCP, not yet due for refills  - RTC 1 yr or sooner prn      Counseling:     Patient was counseled today on current ASCCP pap guidelines, the recommendation for yearly physical exams, safe driving habits, breast " self awareness. She is to see her PCP for other health maintenance.     Use of the [facility] Patient Portal discussed and encouraged during today's visit.

## 2024-11-12 RX ORDER — NORETHINDRONE ACETATE AND ETHINYL ESTRADIOL 1MG-20(21)
1 KIT ORAL
Qty: 84 TABLET | Refills: 3 | Status: SHIPPED | OUTPATIENT
Start: 2024-11-12

## 2024-11-12 NOTE — TELEPHONE ENCOUNTER
Refill Decision Note   Micaela Wheatpollo  is requesting a refill authorization.  Brief Assessment and Rationale for Refill:  Approve     Medication Therapy Plan:        Comments:     Note composed:12:25 AM 11/12/2024

## 2025-01-14 PROBLEM — S62.101A AVULSION FRACTURE OF RIGHT WRIST: Status: ACTIVE | Noted: 2024-08-08

## 2025-01-14 PROBLEM — S62.101A AVULSION FRACTURE OF RIGHT WRIST: Status: RESOLVED | Noted: 2024-08-08 | Resolved: 2025-01-14

## 2025-01-14 PROBLEM — S50.11XA CONTUSION OF RIGHT FOREARM: Status: ACTIVE | Noted: 2024-08-08

## 2025-01-14 PROBLEM — S50.11XA CONTUSION OF RIGHT FOREARM: Status: RESOLVED | Noted: 2024-08-08 | Resolved: 2025-01-14

## 2025-01-14 RX ORDER — DEXTROAMPHETAMINE SACCHARATE, AMPHETAMINE ASPARTATE, DEXTROAMPHETAMINE SULFATE AND AMPHETAMINE SULFATE 2.5; 2.5; 2.5; 2.5 MG/1; MG/1; MG/1; MG/1
1 TABLET ORAL
COMMUNITY
Start: 2024-09-06

## 2025-01-14 RX ORDER — DEXTROAMPHETAMINE SACCHARATE, AMPHETAMINE ASPARTATE MONOHYDRATE, DEXTROAMPHETAMINE SULFATE AND AMPHETAMINE SULFATE 7.5; 7.5; 7.5; 7.5 MG/1; MG/1; MG/1; MG/1
30 CAPSULE, EXTENDED RELEASE ORAL EVERY MORNING
COMMUNITY

## 2025-01-14 NOTE — PROGRESS NOTES
FAMILY MEDICINE  [facility]    Reason for visit:   Chief Complaint   Patient presents with    Annual Exam         SUBJECTIVE: Micaela Arreguin is a 29 y.o. female  - with obesity, depression and ADHD presents for her routine annual physical    Gynecology: Alisha Murphy MD  Psychiatry:  Dr. Alpa Mitchell      Micaela reports falling down stairs twice last year, with one incident resulting in a right wrist fracture in July. She has been undergoing occupational therapy (OT) for the wrist injury. She notes significant improvement in wrist mobility, progressing from complete immobility to substantial progress. Being right-handed, she had difficulties with daily activities such as using utensils, but is now in the recovery phase. The fracture was minor and did not require surgical intervention.    Micaela discusses her nut allergy, noting symptoms primarily with raw nuts, not cooked or roasted ones. She consumed a small amount of raw pecans while cooking during the holiday season, resulting in pruritus and a sensation of heaviness. She tolerates a moderate amount of cooked nuts without significant reaction.    Regarding her GYN health, the patient reports significant improvement in bleeding issues since resuming full-time birth control, which she has been taking consistently. She had pain during one menstrual cycle, which her gynecologist deemed normal. The pain previously associated with an ovarian cyst has largely subsided.    Micaela denies any current concerns or symptoms and reports no significant changes to her family medical history.          Review of Systems   HENT:  Negative for hearing loss.    Eyes:  Negative for discharge.   Respiratory:  Negative for wheezing.    Cardiovascular:  Negative for chest pain and palpitations.   Gastrointestinal:  Negative for blood in stool, constipation, diarrhea and vomiting.   Genitourinary:  Negative for dysuria and hematuria.    Musculoskeletal:  Negative for neck pain.   Neurological:  Negative for weakness and headaches.   Endo/Heme/Allergies:  Negative for polydipsia.   All other systems reviewed and are negative.      HEALTH MAINTENANCE:   Health Maintenance   Topic Date Due    Pap Smear  10/18/2025    TETANUS VACCINE  02/04/2029    RSV Vaccine (Age 60+ and Pregnant patients) (1 - 1-dose 75+ series) 01/26/2071    Hepatitis C Screening  Completed    Influenza Vaccine  Completed    HIV Screening  Completed    COVID-19 Vaccine  Completed    Lipid Panel  Completed    Pneumococcal Vaccines (Age 0-49)  Aged Out     Health Maintenance Topics with due status: Not Due       Topic Last Completion Date    TETANUS VACCINE 02/04/2019    Pap Smear 10/18/2022    RSV Vaccine (Age 60+ and Pregnant patients) Not Due     There are no preventive care reminders to display for this patient.      HISTORY:   Past Medical History:   Diagnosis Date    ADHD (attention deficit hyperactivity disorder)     Avulsion fracture of right wrist 08/08/2024    Depression        Past Surgical History:   Procedure Laterality Date    WISDOM TOOTH EXTRACTION         Family History   Problem Relation Name Age of Onset    Depression Mother Maxine Arreguin     Mental illness Mother Maxine Arreguin     ALS Father      Cancer Maternal Grandfather Morgan Justice     Heart disease Maternal Grandfather Morgan Justice     Cancer Maternal Grandmother Maxine Justice     Heart disease Paternal Grandmother Lyubov Arreguin     Mental illness Sister Shaniqua Arreguin     Mental illness Brother Evin Arreguin     Breast cancer Neg Hx      Colon cancer Neg Hx      Ovarian cancer Neg Hx      Celiac disease Neg Hx      Colon polyps Neg Hx      Esophageal cancer Neg Hx      Liver cancer Neg Hx      Liver disease Neg Hx      Rectal cancer Neg Hx      Stomach cancer Neg Hx         Social History     Tobacco Use    Smoking status: Never     Passive exposure: Never    Smokeless  "tobacco: Never   Substance Use Topics    Alcohol use: Yes     Alcohol/week: 2.0 standard drinks of alcohol     Types: 1 Glasses of wine, 1 Cans of beer per week     Comment: socially    Drug use: Never       Social History     Social History Narrative    Single. Lives alone with cat (2024 4 yo). From [place], [place]. 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 Works in the her childhood  in [place]       ALLERGIES:   Review of patient's allergies indicates:   Allergen Reactions    Nut - unspecified Itching and Swelling       MEDS:   Current Outpatient Medications on File Prior to Visit   Medication Sig Dispense Refill Last Dose/Taking    BLISOVI FE 1/20, 28, 1 mg-20 mcg (21)/75 mg (7) per tablet TAKE 1 TABLET BY MOUTH EVERY DAY 84 tablet 3 Taking    desvenlafaxine succinate (PRISTIQ) 50 MG Tb24    Taking    dextroamphetamine-amphetamine (ADDERALL XR) 30 MG 24 hr capsule Take 30 mg by mouth every morning.   Taking    dextroamphetamine-amphetamine 10 mg Tab Take 1 tablet by mouth after lunch.   Taking    EPINEPHrine (EPIPEN) 0.3 mg/0.3 mL AtIn Inject 0.3 mLs (0.3 mg total) into the muscle once. for 1 dose 1 each 0          Vital signs:   Vitals:    01/15/25 0917   BP: 125/87   Patient Position: Sitting   Pulse: 72   SpO2: 100%   Weight: 102.2 kg (225 lb 3.2 oz)   Height: 5' 9" (1.753 m)       Body mass index is 33.26 kg/m².    PHYSICAL EXAM:     Physical Exam  Vitals reviewed.   Constitutional:       General: She is not in acute distress.  HENT:      Head: Normocephalic and atraumatic.      Right Ear: Tympanic membrane and ear canal normal.      Left Ear: Tympanic membrane and ear canal normal.   Eyes:      General: No scleral icterus.     Conjunctiva/sclera: Conjunctivae normal.   Neck:      Thyroid: No thyromegaly.      Vascular: No carotid bruit.   Cardiovascular:      Rate and Rhythm: Normal rate and regular rhythm.      Heart " sounds: Normal heart sounds. No murmur heard.     No friction rub. No gallop.   Pulmonary:      Effort: Pulmonary effort is normal.      Breath sounds: Normal breath sounds. No wheezing, rhonchi or rales.   Abdominal:      General: Bowel sounds are normal. There is no distension.      Palpations: Abdomen is soft.      Tenderness: There is no abdominal tenderness.   Musculoskeletal:      Cervical back: Normal range of motion and neck supple.      Right lower leg: No edema.      Left lower leg: No edema.   Lymphadenopathy:      Cervical: No cervical adenopathy.   Skin:     General: Skin is warm.      Capillary Refill: Capillary refill takes less than 2 seconds.   Neurological:      Mental Status: She is alert.             PERTINENT RESULTS:   Lab Results   Component Value Date    WBC 5.85 10/13/2023    HGB 12.0 10/13/2023    HCT 36.3 (L) 10/13/2023    MCV 89 10/13/2023     10/13/2023       CMP  Sodium   Date Value Ref Range Status   10/13/2023 140 136 - 145 mmol/L Final     Potassium   Date Value Ref Range Status   10/13/2023 3.8 3.5 - 5.1 mmol/L Final     Chloride   Date Value Ref Range Status   10/13/2023 108 95 - 110 mmol/L Final     CO2   Date Value Ref Range Status   10/13/2023 24 23 - 29 mmol/L Final     Glucose   Date Value Ref Range Status   10/13/2023 90 70 - 110 mg/dL Final     BUN   Date Value Ref Range Status   10/13/2023 9 6 - 20 mg/dL Final     Creatinine   Date Value Ref Range Status   10/13/2023 0.8 0.5 - 1.4 mg/dL Final     Calcium   Date Value Ref Range Status   10/13/2023 9.1 8.7 - 10.5 mg/dL Final     Total Protein   Date Value Ref Range Status   10/13/2023 6.7 6.0 - 8.4 g/dL Final     Albumin   Date Value Ref Range Status   10/13/2023 3.5 3.5 - 5.2 g/dL Final     Total Bilirubin   Date Value Ref Range Status   10/13/2023 0.2 0.1 - 1.0 mg/dL Final     Comment:     For infants and newborns, interpretation of results should be based  on gestational age, weight and in agreement with  clinical  observations.    Premature Infant recommended reference ranges:  Up to 24 hours.............<8.0 mg/dL  Up to 48 hours............<12.0 mg/dL  3-5 days..................<15.0 mg/dL  6-29 days.................<15.0 mg/dL       Alkaline Phosphatase   Date Value Ref Range Status   10/13/2023 66 55 - 135 U/L Final     AST   Date Value Ref Range Status   10/13/2023 16 10 - 40 U/L Final     ALT   Date Value Ref Range Status   10/13/2023 16 10 - 44 U/L Final     Anion Gap   Date Value Ref Range Status   10/13/2023 8 8 - 16 mmol/L Final     eGFR   Date Value Ref Range Status   10/13/2023 >60 >60 mL/min/1.73 m^2 Final     Lab Results   Component Value Date    CHOL 158 10/13/2023    CHOL 191 08/03/2022    CHOL 176 11/12/2020     Lab Results   Component Value Date    HDL 41 10/13/2023    HDL 53 08/03/2022    HDL 49 11/12/2020     Lab Results   Component Value Date    LDLCALC 103.8 10/13/2023    LDLCALC 112.2 08/03/2022    LDLCALC 111.8 11/12/2020     Lab Results   Component Value Date    TRIG 66 10/13/2023    TRIG 129 08/03/2022    TRIG 76 11/12/2020       Lab Results   Component Value Date    CHOLHDL 25.9 10/13/2023    CHOLHDL 27.7 08/03/2022    CHOLHDL 27.8 11/12/2020     Lab Results   Component Value Date    TSH 0.820 10/13/2023     Lab Results   Component Value Date    HGBA1C 4.9 10/13/2023         ASSESSMENT/PLAN:    1. Encounter for general adult medical examination with abnormal findings  Overview:  - preventative health counseling  - counseling on current recommendations for breast cancer screening.  Average risk  - counseling on current recommendations for cervical cancer screening.  2022 Pap negative.  Repeat due 10/2025 and followed by Gynecology.   - counseling on current recommendations for colon cancer screening.  Average risk      Orders:  -     TSH; Future; Expected date: 01/15/2025  -     Lipid Panel; Future; Expected date: 01/15/2025  -     Hemoglobin A1C; Future; Expected date: 01/15/2025  -      Comprehensive Metabolic Panel; Future; Expected date: 01/15/2025  -     CBC Auto Differential; Future; Expected date: 01/15/2025    2. BMI 32.0-32.9,adult  Overview:  - good exercise regimen                ORDERS:   Orders Placed This Encounter    TSH    Lipid Panel    Hemoglobin A1C    Comprehensive Metabolic Panel    CBC Auto Differential         Vaccines recommended:  up to date    Follow up in about 1 year (around 1/15/2026) for Annual. or sooner if any concerns.      This note is dictated using the M*Modal Fluency Direct word recognition program. There are word recognition mistakes that are occasionally missed on review.    Dr. Jennifer Ivory D.O.   [facility]

## 2025-01-15 ENCOUNTER — OFFICE VISIT (OUTPATIENT)
Dept: PRIMARY CARE CLINIC | Facility: CLINIC | Age: 30
End: 2025-01-15
Attending: FAMILY MEDICINE
Payer: COMMERCIAL

## 2025-01-15 VITALS
SYSTOLIC BLOOD PRESSURE: 125 MMHG | WEIGHT: 225.19 LBS | HEART RATE: 72 BPM | HEIGHT: 69 IN | BODY MASS INDEX: 33.35 KG/M2 | OXYGEN SATURATION: 100 % | DIASTOLIC BLOOD PRESSURE: 87 MMHG

## 2025-01-15 DIAGNOSIS — Z00.01 ENCOUNTER FOR GENERAL ADULT MEDICAL EXAMINATION WITH ABNORMAL FINDINGS: Primary | ICD-10-CM

## 2025-01-15 LAB
ALBUMIN SERPL BCP-MCNC: 3.5 G/DL (ref 3.5–5.2)
ALP SERPL-CCNC: 80 U/L (ref 40–150)
ALT SERPL W/O P-5'-P-CCNC: 38 U/L (ref 10–44)
ANION GAP SERPL CALC-SCNC: 10 MMOL/L (ref 8–16)
AST SERPL-CCNC: 27 U/L (ref 10–40)
BASOPHILS # BLD AUTO: 0.01 K/UL (ref 0–0.2)
BASOPHILS NFR BLD: 0.2 % (ref 0–1.9)
BILIRUB SERPL-MCNC: 0.4 MG/DL (ref 0.1–1)
BUN SERPL-MCNC: 8 MG/DL (ref 6–20)
CALCIUM SERPL-MCNC: 9.2 MG/DL (ref 8.7–10.5)
CHLORIDE SERPL-SCNC: 108 MMOL/L (ref 95–110)
CHOLEST SERPL-MCNC: 148 MG/DL (ref 120–199)
CHOLEST/HDLC SERPL: 3.8 {RATIO} (ref 2–5)
CO2 SERPL-SCNC: 19 MMOL/L (ref 23–29)
CREAT SERPL-MCNC: 0.8 MG/DL (ref 0.5–1.4)
DIFFERENTIAL METHOD BLD: NORMAL
EOSINOPHIL # BLD AUTO: 0 K/UL (ref 0–0.5)
EOSINOPHIL NFR BLD: 0.7 % (ref 0–8)
ERYTHROCYTE [DISTWIDTH] IN BLOOD BY AUTOMATED COUNT: 11.9 % (ref 11.5–14.5)
EST. GFR  (NO RACE VARIABLE): >60 ML/MIN/1.73 M^2
ESTIMATED AVG GLUCOSE: 94 MG/DL (ref 68–131)
GLUCOSE SERPL-MCNC: 80 MG/DL (ref 70–110)
HBA1C MFR BLD: 4.9 % (ref 4–5.6)
HCT VFR BLD AUTO: 40.9 % (ref 37–48.5)
HDLC SERPL-MCNC: 39 MG/DL (ref 40–75)
HDLC SERPL: 26.4 % (ref 20–50)
HGB BLD-MCNC: 13.4 G/DL (ref 12–16)
IMM GRANULOCYTES # BLD AUTO: 0.01 K/UL (ref 0–0.04)
IMM GRANULOCYTES NFR BLD AUTO: 0.2 % (ref 0–0.5)
LDLC SERPL CALC-MCNC: 91.6 MG/DL (ref 63–159)
LYMPHOCYTES # BLD AUTO: 1.7 K/UL (ref 1–4.8)
LYMPHOCYTES NFR BLD: 39.4 % (ref 18–48)
MCH RBC QN AUTO: 28.9 PG (ref 27–31)
MCHC RBC AUTO-ENTMCNC: 32.8 G/DL (ref 32–36)
MCV RBC AUTO: 88 FL (ref 82–98)
MONOCYTES # BLD AUTO: 0.4 K/UL (ref 0.3–1)
MONOCYTES NFR BLD: 9.3 % (ref 4–15)
NEUTROPHILS # BLD AUTO: 2.2 K/UL (ref 1.8–7.7)
NEUTROPHILS NFR BLD: 50.2 % (ref 38–73)
NONHDLC SERPL-MCNC: 109 MG/DL
NRBC BLD-RTO: 0 /100 WBC
PLATELET # BLD AUTO: 358 K/UL (ref 150–450)
PMV BLD AUTO: 10.5 FL (ref 9.2–12.9)
POTASSIUM SERPL-SCNC: 4.1 MMOL/L (ref 3.5–5.1)
PROT SERPL-MCNC: 7 G/DL (ref 6–8.4)
RBC # BLD AUTO: 4.63 M/UL (ref 4–5.4)
SODIUM SERPL-SCNC: 137 MMOL/L (ref 136–145)
TRIGL SERPL-MCNC: 87 MG/DL (ref 30–150)
TSH SERPL DL<=0.005 MIU/L-ACNC: 0.88 UIU/ML (ref 0.4–4)
WBC # BLD AUTO: 4.39 K/UL (ref 3.9–12.7)

## 2025-01-15 PROCEDURE — 99999 PR PBB SHADOW E&M-EST. PATIENT-LVL III: CPT | Mod: PBBFAC,,, | Performed by: FAMILY MEDICINE

## 2025-01-15 PROCEDURE — 83036 HEMOGLOBIN GLYCOSYLATED A1C: CPT | Performed by: FAMILY MEDICINE

## 2025-01-15 PROCEDURE — 99395 PREV VISIT EST AGE 18-39: CPT | Mod: S$GLB,,, | Performed by: FAMILY MEDICINE

## 2025-01-15 PROCEDURE — 80061 LIPID PANEL: CPT | Performed by: FAMILY MEDICINE

## 2025-01-15 PROCEDURE — 1160F RVW MEDS BY RX/DR IN RCRD: CPT | Mod: CPTII,S$GLB,, | Performed by: FAMILY MEDICINE

## 2025-01-15 PROCEDURE — 85025 COMPLETE CBC W/AUTO DIFF WBC: CPT | Performed by: FAMILY MEDICINE

## 2025-01-15 PROCEDURE — 3008F BODY MASS INDEX DOCD: CPT | Mod: CPTII,S$GLB,, | Performed by: FAMILY MEDICINE

## 2025-01-15 PROCEDURE — 3074F SYST BP LT 130 MM HG: CPT | Mod: CPTII,S$GLB,, | Performed by: FAMILY MEDICINE

## 2025-01-15 PROCEDURE — 1159F MED LIST DOCD IN RCRD: CPT | Mod: CPTII,S$GLB,, | Performed by: FAMILY MEDICINE

## 2025-01-15 PROCEDURE — 3079F DIAST BP 80-89 MM HG: CPT | Mod: CPTII,S$GLB,, | Performed by: FAMILY MEDICINE

## 2025-01-15 PROCEDURE — 80053 COMPREHEN METABOLIC PANEL: CPT | Performed by: FAMILY MEDICINE

## 2025-01-15 PROCEDURE — 84443 ASSAY THYROID STIM HORMONE: CPT | Performed by: FAMILY MEDICINE

## 2025-08-04 ENCOUNTER — OFFICE VISIT (OUTPATIENT)
Dept: PRIMARY CARE CLINIC | Facility: CLINIC | Age: 30
End: 2025-08-04
Payer: COMMERCIAL

## 2025-08-04 VITALS
SYSTOLIC BLOOD PRESSURE: 126 MMHG | TEMPERATURE: 98 F | BODY MASS INDEX: 32.3 KG/M2 | HEART RATE: 88 BPM | WEIGHT: 218.06 LBS | HEIGHT: 69 IN | DIASTOLIC BLOOD PRESSURE: 86 MMHG | OXYGEN SATURATION: 100 %

## 2025-08-04 DIAGNOSIS — Z20.828 CONTACT WITH OR EXPOSURE TO VIRAL DISEASE: Primary | ICD-10-CM

## 2025-08-04 DIAGNOSIS — L56.8 PHOTOSENSITIVITY DERMATITIS: ICD-10-CM

## 2025-08-04 PROBLEM — Z00.01 ENCOUNTER FOR GENERAL ADULT MEDICAL EXAMINATION WITH ABNORMAL FINDINGS: Status: RESOLVED | Noted: 2023-10-09 | Resolved: 2025-08-04

## 2025-08-04 PROCEDURE — 3079F DIAST BP 80-89 MM HG: CPT | Mod: CPTII,S$GLB,,

## 2025-08-04 PROCEDURE — 1159F MED LIST DOCD IN RCRD: CPT | Mod: CPTII,S$GLB,,

## 2025-08-04 PROCEDURE — 99999 PR PBB SHADOW E&M-EST. PATIENT-LVL III: CPT | Mod: PBBFAC,,,

## 2025-08-04 PROCEDURE — 3008F BODY MASS INDEX DOCD: CPT | Mod: CPTII,S$GLB,,

## 2025-08-04 PROCEDURE — 3074F SYST BP LT 130 MM HG: CPT | Mod: CPTII,S$GLB,,

## 2025-08-04 PROCEDURE — 3044F HG A1C LEVEL LT 7.0%: CPT | Mod: CPTII,S$GLB,,

## 2025-08-04 PROCEDURE — 99214 OFFICE O/P EST MOD 30 MIN: CPT | Mod: S$GLB,,,

## 2025-08-04 RX ORDER — SILVER SULFADIAZINE 10 G/1000G
CREAM TOPICAL 2 TIMES DAILY
Qty: 20 G | Refills: 0 | Status: SHIPPED | OUTPATIENT
Start: 2025-08-04 | End: 2025-08-04 | Stop reason: ALTCHOICE

## 2025-08-04 NOTE — ASSESSMENT & PLAN NOTE
- Patient developed sunburn to knuckles of bilateral hands after canoeing while on Doxycycline.   - Appearance improving with no blistering or drainage.  - Recommend cool compresses.  - Advised patient to increase fluid intake and maintain adequate hydration.  - OK to moisturize skin with aloe vera.  - Advised to avoid further sun exposure on burned areas and follow up with dermatologist if condition does not improve with topical treatment.

## 2025-08-04 NOTE — ASSESSMENT & PLAN NOTE
- Assessed concerns about potential Lyme disease following tick bite.  - Determined low likelihood of infection given prompt and appropriate doxycycline treatment.  - Deferred Lyme antibody testing due to low clinical suspicion and potential cost considerations.  - Advised patient to increase fluid intake and maintain adequate hydration.  - Educated patient on signs and symptoms to monitor for late-onset Lyme disease, including extreme fatigue, neurological changes, migratory joint pain, and fever.

## 2025-08-12 ENCOUNTER — OFFICE VISIT (OUTPATIENT)
Dept: URGENT CARE | Facility: CLINIC | Age: 30
End: 2025-08-12
Payer: COMMERCIAL

## 2025-08-12 VITALS
RESPIRATION RATE: 18 BRPM | HEIGHT: 69 IN | DIASTOLIC BLOOD PRESSURE: 80 MMHG | TEMPERATURE: 98 F | SYSTOLIC BLOOD PRESSURE: 116 MMHG | HEART RATE: 91 BPM | BODY MASS INDEX: 32.29 KG/M2 | WEIGHT: 218 LBS | OXYGEN SATURATION: 99 %
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
## 2025-08-12 DIAGNOSIS — S60.861A INSECT BITE OF RIGHT WRIST, INITIAL ENCOUNTER: Primary | ICD-10-CM

## 2025-08-12 PROCEDURE — 99213 OFFICE O/P EST LOW 20 MIN: CPT | Mod: S$GLB,,, | Performed by: FAMILY MEDICINE

## 2025-08-12 RX ORDER — CEPHALEXIN 500 MG/1
500 CAPSULE ORAL EVERY 6 HOURS
Qty: 20 CAPSULE | Refills: 0 | Status: SHIPPED | OUTPATIENT
Start: 2025-08-12 | End: 2025-08-17

## 2025-08-12 RX ORDER — MUPIROCIN 20 MG/G
OINTMENT TOPICAL 3 TIMES DAILY
Qty: 2 G | Refills: 0 | Status: SHIPPED | OUTPATIENT
Start: 2025-08-12 | End: 2025-08-19

## 2025-08-12 RX ORDER — TRIAMCINOLONE ACETONIDE 1 MG/G
OINTMENT TOPICAL 2 TIMES DAILY
Qty: 80 G | Refills: 0 | Status: SHIPPED | OUTPATIENT
Start: 2025-08-12